# Patient Record
Sex: MALE
[De-identification: names, ages, dates, MRNs, and addresses within clinical notes are randomized per-mention and may not be internally consistent; named-entity substitution may affect disease eponyms.]

---

## 2017-03-28 ENCOUNTER — HOSPITAL ENCOUNTER (OUTPATIENT)
Dept: HOSPITAL 42 - CATH | Age: 77
Discharge: HOME | End: 2017-03-28
Attending: INTERNAL MEDICINE
Payer: COMMERCIAL

## 2017-03-28 VITALS — BODY MASS INDEX: 21.6 KG/M2

## 2017-03-28 DIAGNOSIS — I25.10: Primary | ICD-10-CM

## 2017-03-28 DIAGNOSIS — I10: ICD-10-CM

## 2017-03-28 DIAGNOSIS — Z79.84: ICD-10-CM

## 2017-03-28 DIAGNOSIS — E11.9: ICD-10-CM

## 2017-03-28 LAB
ADD MANUAL DIFF?: NO
APTT BLD: 26.7 SECONDS (ref 23.7–30.8)
BASOPHILS # BLD AUTO: 0.02 K/MM3 (ref 0–2)
BASOPHILS NFR BLD: 0.3 % (ref 0–3)
BUN SERPL-MCNC: 23 MG/DL (ref 7–21)
CALCIUM SERPL-MCNC: 9.6 MG/DL (ref 8.4–10.5)
CHLORIDE SERPL-SCNC: 105 MMOL/L (ref 98–107)
CO2 SERPL-SCNC: 28 MMOL/L (ref 21–33)
EOSINOPHIL # BLD: 0.5 10*3/UL (ref 0–0.7)
EOSINOPHIL NFR BLD: 6.8 % (ref 1.5–5)
ERYTHROCYTE [DISTWIDTH] IN BLOOD BY AUTOMATED COUNT: 14.5 % (ref 11.5–14.5)
GLUCOSE SERPL-MCNC: 102 MG/DL (ref 70–110)
GRANULOCYTES # BLD: 5.46 10*3/UL (ref 1.4–6.5)
GRANULOCYTES NFR BLD: 68.6 % (ref 50–68)
HCT VFR BLD CALC: 40.7 % (ref 42–52)
INR PPP: 0.91 (ref 0.93–1.08)
LYMPHOCYTES # BLD: 1.5 10*3/UL (ref 1.2–3.4)
LYMPHOCYTES NFR BLD AUTO: 18.9 % (ref 22–35)
MCH RBC QN AUTO: 30 PG (ref 25–35)
MCHC RBC AUTO-ENTMCNC: 33.2 G/DL (ref 31–37)
MCV RBC AUTO: 90.4 FL (ref 80–105)
MONOCYTES # BLD AUTO: 0.4 10*3/UL (ref 0.1–0.6)
MONOCYTES NFR BLD: 5.4 % (ref 1–6)
PLATELET # BLD: 248 10^3/UL (ref 120–450)
PMV BLD AUTO: 10.5 FL (ref 7–11)
POTASSIUM SERPL-SCNC: 4.6 MMOL/L (ref 3.6–5)
SODIUM SERPL-SCNC: 142 MMOL/L (ref 132–148)
WBC # BLD AUTO: 8 10^3/UL (ref 4.5–11)

## 2017-03-28 PROCEDURE — 86850 RBC ANTIBODY SCREEN: CPT

## 2017-03-28 PROCEDURE — 99152 MOD SED SAME PHYS/QHP 5/>YRS: CPT

## 2017-03-28 PROCEDURE — 85025 COMPLETE CBC W/AUTO DIFF WBC: CPT

## 2017-03-28 PROCEDURE — 99153 MOD SED SAME PHYS/QHP EA: CPT

## 2017-03-28 PROCEDURE — 36415 COLL VENOUS BLD VENIPUNCTURE: CPT

## 2017-03-28 PROCEDURE — 93458 L HRT ARTERY/VENTRICLE ANGIO: CPT

## 2017-03-28 PROCEDURE — 93571 IV DOP VEL&/PRESS C FLO 1ST: CPT

## 2017-03-28 PROCEDURE — 80048 BASIC METABOLIC PNL TOTAL CA: CPT

## 2017-03-28 PROCEDURE — 86900 BLOOD TYPING SEROLOGIC ABO: CPT

## 2017-03-28 PROCEDURE — 85610 PROTHROMBIN TIME: CPT

## 2017-03-28 PROCEDURE — 85730 THROMBOPLASTIN TIME PARTIAL: CPT

## 2017-03-29 VITALS — TEMPERATURE: 97.4 F

## 2017-03-29 VITALS
OXYGEN SATURATION: 99 % | RESPIRATION RATE: 18 BRPM | HEART RATE: 64 BPM | DIASTOLIC BLOOD PRESSURE: 70 MMHG | SYSTOLIC BLOOD PRESSURE: 139 MMHG

## 2017-05-31 ENCOUNTER — HOSPITAL ENCOUNTER (OUTPATIENT)
Dept: HOSPITAL 42 - ED | Age: 77
Setting detail: OBSERVATION
LOS: 2 days | Discharge: HOME | End: 2017-06-02
Attending: FAMILY MEDICINE | Admitting: FAMILY MEDICINE
Payer: MEDICARE

## 2017-05-31 VITALS — BODY MASS INDEX: 22.1 KG/M2

## 2017-05-31 DIAGNOSIS — K52.9: Primary | ICD-10-CM

## 2017-05-31 DIAGNOSIS — I25.10: ICD-10-CM

## 2017-05-31 DIAGNOSIS — M19.90: ICD-10-CM

## 2017-05-31 DIAGNOSIS — I10: ICD-10-CM

## 2017-05-31 DIAGNOSIS — E11.649: ICD-10-CM

## 2017-05-31 DIAGNOSIS — M54.30: ICD-10-CM

## 2017-05-31 DIAGNOSIS — K21.9: ICD-10-CM

## 2017-05-31 DIAGNOSIS — Z79.84: ICD-10-CM

## 2017-05-31 DIAGNOSIS — Z79.82: ICD-10-CM

## 2017-05-31 DIAGNOSIS — Z86.73: ICD-10-CM

## 2017-05-31 LAB
ADD MANUAL DIFF?: NO
ALBUMIN/GLOB SERPL: 1.1 {RATIO}
ALP SERPL-CCNC: 37 U/L
ALT SERPL-CCNC: 31 U/L
APPEARANCE UR: CLEAR
AST SERPL-CCNC: 34 U/L
BACTERIA #/AREA URNS HPF: (no result) /[HPF]
BASE EXCESS BLDV CALC-SCNC: 1.1 MMOL/L
BASOPHILS # BLD AUTO: 0.01 K/MM3
BASOPHILS NFR BLD: 0.1 %
BILIRUB SERPL-MCNC: 0.5 MG/DL
BILIRUB UR-MCNC: NEGATIVE MG/DL
BUN SERPL-MCNC: 29 MG/DL
CALCIUM SERPL-MCNC: 8.7 MG/DL
CHLORIDE SERPL-SCNC: 109 MMOL/L
CO2 SERPL-SCNC: 24 MMOL/L
COLOR UR: YELLOW
EOSINOPHIL # BLD: 0.1 10*3/UL
EOSINOPHIL NFR BLD: 0.4 %
EPI CELLS #/AREA URNS HPF: (no result) /HPF
ERYTHROCYTE [DISTWIDTH] IN BLOOD BY AUTOMATED COUNT: 14.3 %
GLOBULIN SER-MCNC: 3.1 GM/DL
GLUCOSE SERPL-MCNC: 125 MG/DL
GLUCOSE UR STRIP-MCNC: NEGATIVE MG/DL
GRANULOCYTES # BLD: 12.78 10*3/UL
GRANULOCYTES NFR BLD: 91.2 %
HCT VFR BLD CALC: 38.5 %
KETONES UR STRIP-MCNC: NEGATIVE MG/DL
LEUKOCYTE ESTERASE UR-ACNC: NEGATIVE LEU/UL
LIPASE SERPL-CCNC: 219 U/L
LYMPHOCYTES # BLD: 0.8 10*3/UL
LYMPHOCYTES NFR BLD AUTO: 5.9 %
MCH RBC QN AUTO: 30 PG
MCHC RBC AUTO-ENTMCNC: 33 G/DL
MCV RBC AUTO: 90.8 FL
MONOCYTES # BLD AUTO: 0.3 10*3/UL
MONOCYTES NFR BLD: 2.4 %
PH BLDV: 7.44 [PH]
PH UR STRIP: 7 [PH]
PLATELET # BLD: 219 10^3/UL
PMV BLD AUTO: 10.6 FL
POTASSIUM SERPL-SCNC: 4.4 MMOL/L
PROT SERPL-MCNC: 6.6 G/DL
PROT UR STRIP-MCNC: 100 MG/DL
RBC # UR STRIP: NEGATIVE /UL
RBC #/AREA URNS HPF: (no result) /HPF
SODIUM SERPL-SCNC: 138 MMOL/L
SP GR UR STRIP: 1.01
UROBILINOGEN UR STRIP-ACNC: 0.2 E.U./DL
WBC # BLD AUTO: 14 10^3/UL
WBC #/AREA URNS HPF: (no result) /HPF

## 2017-05-31 PROCEDURE — 83690 ASSAY OF LIPASE: CPT

## 2017-05-31 PROCEDURE — 96365 THER/PROPH/DIAG IV INF INIT: CPT

## 2017-05-31 PROCEDURE — 80053 COMPREHEN METABOLIC PANEL: CPT

## 2017-05-31 PROCEDURE — 82948 REAGENT STRIP/BLOOD GLUCOSE: CPT

## 2017-05-31 PROCEDURE — 82803 BLOOD GASES ANY COMBINATION: CPT

## 2017-05-31 PROCEDURE — 96367 TX/PROPH/DG ADDL SEQ IV INF: CPT

## 2017-05-31 PROCEDURE — 99285 EMERGENCY DEPT VISIT HI MDM: CPT

## 2017-05-31 PROCEDURE — 74177 CT ABD & PELVIS W/CONTRAST: CPT

## 2017-05-31 PROCEDURE — 87040 BLOOD CULTURE FOR BACTERIA: CPT

## 2017-05-31 PROCEDURE — 85025 COMPLETE CBC W/AUTO DIFF WBC: CPT

## 2017-05-31 PROCEDURE — 81001 URINALYSIS AUTO W/SCOPE: CPT

## 2017-05-31 PROCEDURE — 93005 ELECTROCARDIOGRAM TRACING: CPT

## 2017-05-31 PROCEDURE — 96366 THER/PROPH/DIAG IV INF ADDON: CPT

## 2017-05-31 PROCEDURE — 85027 COMPLETE CBC AUTOMATED: CPT

## 2017-05-31 PROCEDURE — 96375 TX/PRO/DX INJ NEW DRUG ADDON: CPT

## 2017-05-31 PROCEDURE — 36415 COLL VENOUS BLD VENIPUNCTURE: CPT

## 2017-05-31 PROCEDURE — 74181 MRI ABDOMEN W/O CONTRAST: CPT

## 2017-05-31 PROCEDURE — 80074 ACUTE HEPATITIS PANEL: CPT

## 2017-05-31 NOTE — ED PDOC
Arrival/HPI





- General


Chief Complaint: Abdominal Pain


Time Seen by Provider: 05/31/17 07:03


Historian: Patient





- History of Present Illness


Narrative History of Present Illness (Text): 


05/31/17 07:05


A 77 year old male, whose past medical history includes multiple small bowel 

obstructions, appendectomy, and cholecystectomy, presents to the emergency 

department complaining of torito-umbilical abdominal pain that began 4 hours ago. 

Patient notes pain is similar to previous episodes of small bowel obstruction 

and is associated with nausea and one episode of non bloody non bilious 

vomiting. He denies any urinary/bowel changes, fever, chest pain, shortness of 

breath, or any other complaints at this time.





PMD: Dr. Mendoza


Time/Duration: 4-6 hours


Symptom Onset: Sudden


Symptom Course: Unchanged


Quality: Other


Activities at Onset: Rest


Context: Home


Associated Symptoms (Text): 


nausea and vomiting





Past Medical History





- Provider Review


Nursing Documentation Reviewed: Yes





- Infectious Disease


Hx of Infectious Diseases: None





- Tetanus Immunization


Tetanus Immunization: Unknown





- Reproductive


Currently Pregnant: No





- Cardiac


Hx Pacemaker: No





- Pulmonary


Hx Respiratory Disorders: No





- Neurological


Hx Paralysis: No





- HEENT


Hx HEENT Disorder: Yes (WEARS RX GLASSES FOR READING)


Hx Cataracts: Yes (BILATERAL SURGERY)





- Renal


Hx Renal Disorder: No





- Endocrine/Metabolic


Hx Diabetes Mellitus Type 2: Yes





- Hematological/Oncological


Hx Blood Transfusions: No


Hx Blood Transfusion Reaction: No





- Integumentary


Hx Dermatological Disorder: No





- Musculoskeletal/Rheumatological


Hx Musculoskeletal Disorders: Yes (SCIATICA)





- Gastrointestinal


Hx Gastrointestinal Disorders: Yes (APPENDECTOMY(RUPTURED APPENDIX))


Hx Gall Bladder Disease: Yes (CHOLELITHIASIS)


Other/Comment: Bowel obstruction





- Genitourinary/Gynecological


Hx Genitourinary Disorders: Yes


Hx Prostate Problems: Yes





- Psychiatric


Hx Emotional Abuse: No


Hx Physical Abuse: No


Hx Substance Use: No





- Past Surgical History


Past Surgical History: No Previous





- Surgical History


Hx Appendectomy: Yes


Hx Cholecystectomy: Yes





- Anesthesia


Hx Anesthesia Reactions: No


Hx Malignant Hyperthermia: No





- Suicidal Assessment


Feels Threatened In Home Enviroment: No





Family/Social History





- Physician Review


Nursing Documentation Reviewed: Yes


Family/Social History: Unknown Family HX


Smoking Status: Never Smoked


Hx Alcohol Use: Yes (RED WINE AFTER DINNER)


Hx Substance Use: No


Hx Substance Use Treatment: No





Allergies/Home Meds


Allergies/Adverse Reactions: 


Allergies





No Known Allergies Allergy (Verified 03/05/17 03:24)


 








Home Medications: 


 Home Meds











 Medication  Instructions  Recorded  Confirmed


 


Valsartan [Diovan] 40 mg PO DAILY 11/30/16 05/31/17


 


metFORMIN [glucOPHAGE] 500 mg PO DAILY 11/30/16 05/31/17


 


Aspirin [Ecotrin] 81 mg PO DAILY 03/23/17 05/31/17














Review of Systems





- Review of Systems


Constitutional: absent: Fevers


ENT: absent: Rhinorrhea


Respiratory: absent: SOB, Cough


Cardiovascular: absent: Chest Pain


Gastrointestinal: Abdominal Pain, Nausea, Vomiting.  absent: Stool Changes, 

Constipation, Diarrhea, Hematochezia


Genitourinary Male: absent: Dysuria, Frequency, Hematuria, Urinary Output 

Changes


Musculoskeletal: absent: Back Pain, Neck Pain


Skin: absent: Rash


Neurological: absent: Headache


Endocrine: absent: Polyuria


Psychiatric: absent: Depression





Physical Exam


Vital Signs Reviewed: Yes


Vital Signs











  Temp Pulse Resp BP Pulse Ox


 


 05/31/17 11:20   59 L  16  164/92 H  100


 


 05/31/17 08:30   60  16  160/70 H  97


 


 05/31/17 06:22  97.8 F  61  16  170/78 H  100











Temperature: Afebrile


Blood Pressure: Hypertensive


Pulse: Regular


Respiratory Rate: Normal


Appearance: Positive for: Well-Appearing, Non-Toxic, Comfortable


Pain Distress: None


Mental Status: Positive for: Alert and Oriented X 3





- Systems Exam


Head: Present: Atraumatic, Normocephalic


Pupils: Present: PERRL


Extroacular Muscles: Present: EOMI


Conjunctiva: Present: Normal


Mouth: Present: Moist Mucous Membranes


Neck: Present: Normal Range of Motion


Respiratory/Chest: Present: Clear to Auscultation, Good Air Exchange.  No: 

Respiratory Distress, Accessory Muscle Use


Cardiovascular: Present: Regular Rate and Rhythm, Normal S1, S2.  No: Murmurs


Abdomen: Present: Normal Bowel Sounds.  No: Tenderness, Distention, Peritoneal 

Signs, Rebound, Guarding


Back: No: CVA Tenderness


Upper Extremity: Present: Normal Inspection.  No: Cyanosis, Edema


Lower Extremity: Present: Normal Inspection.  No: Edema


Neurological: Present: GCS=15, CN II-XII Intact, Speech Normal


Skin: Present: Warm, Dry, Normal Color.  No: Rashes


Psychiatric: Present: Alert, Oriented x 3, Normal Insight, Normal Concentration





Medical Decision Making


ED Course and Treatment: 


05/31/17 07:05


Impression:


A 77 year old male with abdominal pain.





Differential Diagnosis include but are not limited to: Small bowel obstruction 

vs. Pancreatitis vs. Gastritis





Plan:


-- Abdomen/Pelvis CT 


-- Labs


-- Urinalysis 


-- Morphine, Zofran and IV Fluids


-- Reassess and disposition





Prior Visits:


Notes and results from previous visits were reviewed. The patient last 

presented to the emergency department on 03/05/17 for evaluation of abdominal 

pain. 





Progress Notes:





EKG:


Ordered, reviewed, and independently interpreted the EKG.


Rate : 61 BPM


Rhythm : NSR


Interpretation : Normal intervals, No ST/T changes


05/31/17 07:19





05/31/17 12:39


CT as above.  Spoke to Dr. Mendoza.  Due to CT results, elevated wbc, and 

abdominal pain, recommend med/sx observation with for IV antibiotics with GI 

and ID consult.





- Lab Interpretations


I have reviewed the lab results: Yes





- Medication Orders


Current Medication Orders: 








Ciprofloxacin (Cipro 400mg/200ml Dsw)  400 mg in 200 mls @ 133.3 mls/hr IVPB 

STAT STA


   PRN Reason: Protocol


   Stop: 05/31/17 13:57


Metronidazole (Flagyl)  500 mg in 100 mls @ 100 mls/hr IVPB STAT STA


   PRN Reason: Protocol


   Stop: 05/31/17 13:26





Discontinued Medications





Sodium Chloride (Sodium Chloride 0.9%)  500 mls @ 999 mls/hr IV .Q31M STA


   Stop: 05/31/17 07:44


   Last Admin: 05/31/17 07:46  Dose: 999 mls/hr





Iohexol (Omnipaque 240 (50 Ml)) Confirm Administered Dose 50 ml .ROUTE .STK-MED 

ONE


   Stop: 05/31/17 07:28


Iohexol (Omnipaque 350 100 Ml) Confirm Administered Dose 350 mg .ROUTE .STK-MED 

ONE


   Stop: 05/31/17 09:12


Morphine Sulfate (Morphine)  4 mg IVP STAT STA


   Stop: 05/31/17 07:15


   Last Admin: 05/31/17 07:47  Dose: 4 mg





Re-Assess: MAR Pain Assessment


 Document     05/31/17 08:47  MD  (Rec: 05/31/17 09:24  MD  [id number])


     Pain Reassessment


      Is this a pain reassessment?               Yes


     Sleep


      Is patient sleeping during reassessment?   No


     Presence of Pain


      Presence of Pain                           No





Ondansetron HCl (Zofran Inj)  4 mg IVP STAT STA


   Stop: 05/31/17 07:15


   Last Admin: 05/31/17 07:47  Dose: 4 mg











ED OBSERVATION


Date of observation admission: 05/31/17


Time of observation admission: 07:10





- Observation admission statement


Patient is being placed in observation because:: 


abdominal pain, need CT scan





- Goals of Observation


Goals of observation are:: 


pain management and obtain series of abdominal examinations





- Progress Note


Progress Note: 





05/31/17 07:10


EKG:


Ordered, reviewed, and independently interpreted the EKG.


Rate : 61 BPM


Rhythm : NSR


Interpretation : Normal intervals, No ST/T changes





05/31/17 08:15


On reevaluation, the patient is sitting up in bed drinking contrast.





05/31/17 10:10


On reevaluation, the patient is resting and is in no acute distress at this 

time. Abdomen remain soft and non tender.





05/31/17 12:00


On reevaluation, the patient is resting comfortably, awaiting CT results.





05/31/17 12:20


Abdomen/Pelvis CT: Creator : Anam Brown MD


COMPARISON: Comparison made with CT scan abdomen and pelvis 03/05/2017


FINDINGS:


LOWER THORAX: Atelectatic and scarring changes both lung bases right greater 

than left including the lingular and middle lobe regions.  No effusion. No 

evidence of basilar pneumothorax. Heart is enlarged. No significant pericardial 

effusion. Hiatal hernia. Minor wall thickening of the distal esophagus that 

likely due to protrusion of gastric mucosa.  Possibility of esophagitis not 

excluded. Small 


LIVER: Mild central intrahepatic biliary ductal dilatation likely due to some 

combination of cholecystectomy and advanced age. Mild fatty hepatic 

infiltration.  Portal and splenic veins are opacified. 


GALLBLADDER AND BILE DUCTS: Status post cholecystectomy with moderate 

dilatation (just over 13 mm) of the common bile duct. CBD dilatation is likely 

in part due to a combination of cholecystectomy and advanced age however MRCP 

could be performed for further evaluation as to evaluate the distal common bile 

duct, Ampulla of Vater and pancreatic head region 


PANCREAS: There is dilatation of the pancreatic duct . No discrete pancreatic 

mass lesion is identified however follow-up MRCP is recommended due to evaluate 

the distal common bile duct, pancreatic head and Ampulla of Vater. 


SPLEEN: The spleen exhibits normal size and attenuation pattern.  There is a 

tiny approximately 3.5-4 mm nonspecific low-attenuation focus at inferior 

aspect of the splenic parenchyma too small characterize. . 


ADRENALS: Mild adrenal hyperplasia. 


KIDNEYS AND URETERS: The kidneys exhibit relatively symmetric nephrograms. No 

evidence of nephrolithiasis. Prominent bilateral extrarenal pelves and proximal 

ureters though both ureters taper rapidly to a normal caliber.


BLADDER: Urinary bladder is moderate to significantly distended.  Mild wall 

thickening on may in part be due to muscular hypertrophy. Other intrinsic/

invasive wall lesion less likely. Rule out chronic outlet obstruction due to 

enlarged prostate gland. 


REPRODUCTIVE: Prostate gland appears enlarged likely due to benign prostatic 

hypertrophy however correlation with PSA to exclude prostate carcinoma. . 


APPENDIX: Appendix is not seen with complete certainty however no obvious 

inflammatory changes right lower quadrant of the abdomen. There is moderate 

amount of stool seen throughout the entire colon consistent with fecal retention

/ constipation.


BOWEL: Evaluation of the bowel is limited due to incomplete opacification. The 

stomach is distended with oral contrast material and air. . .  There are 

nonspecific wall thickening changes of a loop of jejunum right mid abdomen 

associated mild distension of bone 1 2 loops of proximal small of bowel of 

uncertain etiology; rule out out localized enteritis.  Other intrinsic/invasive 

wall lesion cannot be excluded.  Clinical correlation recommended.. No evidence 

of complete acute mechanical small bowel obstruction however there as there is 

a small amount of oral contrast material seen opacifying the proximal cecum.  

The appendix is not seen with complete certainty. Mild wall thickening of the 

sigmoid at colon to the level of the rectosigmoid junction which may in part be 

secondary to underdistention and peristalsis is well as adherent under 

opacified bowel however possibility of mild nonspecific inflammatory process 

cannot be excluded.  Clinical correlation recommended. 


PERITONEUM: No evidence of free intraperitoneal air. No free or loculated fluid 

collections. Air.


LYMPH NODES: There appears to be a few small nonspecific retroperitoneal lymph 

nodes. 


VASCULATURE: Unremarkable. No aortic aneurysm. 


BONES: Multilevel degenerative spondylosis of the lower thoracic and lumbar 

spine. Bilateral spondylolysis L5 level with less than grade 1 

spondylolisthesis at the L5-S1 level. . No acute compression fractures no 

retropulsed fragments. 


OTHER FINDINGS: None. 


IMPRESSION: Findings consistent with nonspecific enteritis at involving the the 

jejunum of with luminal narrowing though there is no evidence of complete 

obstruction.  In addition, there is also mild wall thickening of the sigmoid 

colon which may in part be secondary to some combination of incomplete 

distention, as peristalsis and adherent under opacified bowel however 

nonspecific inflammatory process involving the colon should also be excluded. 

Mild constipation.  Enlarged prostate gland with moderately distended urinary 

bladder.  Rule out bladder outlet obstruction. Correlation with PSA recommended 

to exclude prostatic carcinoma.  Status post cholecystectomy with moderate 

dilatation of the common bile duct.  There is also dilatation of the pancreatic 

duct.  No definitive pancreatic mass lesion seen however followup MRCP is 

recommended to evaluate the distal common bile duct, pancreatic head and 

Ampulla of Vater. There is also mild intrahepatic biliary ductal dilatation. 

Mild fatty hepatic infiltration.  Tiny sub cm low-attenuation focus inferior 

aspect of the splenic parenchyma too small to characterize. Adrenal hyperplasia 

felt to be present.  Bilateral spondylolysis L5 segment with less than grade 1 

spondylolisthesis  See above discussion for additional findings and details.




















- Scribe Statement


The provider has reviewed the documentation as recorded by the Kizzyibe





Calvin Chacko





Provider Scribe Attestation:


All medical record entries made by the Kizzyibpage were at my direction and 

personally dictated by me. I have reviewed the chart and agree that the record 

accurately reflects my personal performance of the history, physical exam, 

medical decision making, and the department course for this patient. I have 

also personally directed, reviewed, and agree with the discharge instructions 

and disposition.











Disposition/Present on Arrival





- Present on Arrival


Any Indicators Present on Arrival: No


History of DVT/PE: No


History of Uncontrolled Diabetes: No


Urinary Catheter: No


History of Decub. Ulcer: No


History Surgical Site Infection Following: None





- Disposition


Have Diagnosis and Disposition been Completed?: Yes


Diagnosis: 


 Abdominal pain, Enteritis, Colitis





Disposition: HOSPITALIZED


Disposition Time: 07:10


Patient Plan: Observation


Condition: FAIR

## 2017-05-31 NOTE — CT
PROCEDURE:  CT abdomen and pelvis dated 05/31/2017 



HISTORY:

abdominal pain, hx of SBO



COMPARISON:

Comparison made with CT scan abdomen and pelvis 03/05/2017



TECHNIQUE:

Contiguous axial images of the abdomen and pe pelvis performed 

following oral and intravenous injection of approximately 100 cc of 

Omnipaque 350 contrast material. Coronal and Sagittal reformats 

generated. 



Radiation dose:



Total exam DLP = 247.81 mGy-cm.



This CT exam was performed using one or more of the following dose 

reduction techniques: Automated exposure control, adjustment of the 

mA and/or kV according to patient size, and/or use of iterative 

reconstruction technique.



FINDINGS:



LOWER THORAX:

Atelectatic and scarring changes both lung bases right greater than 

left including the lingular and middle lobe regions.  No effusion. No 

evidence of basilar pneumothorax. Heart is enlarged. No significant 

pericardial effusion. Hiatal hernia. Minor wall thickening of the 

distal esophagus that likely due to protrusion of gastric mucosa.  

Possibility of esophagitis not excluded. Small 



LIVER:

Mild central intrahepatic biliary ductal dilatation likely due to 

some combination of cholecystectomy and advanced age. Mild fatty 

hepatic infiltration.  Portal and splenic veins are opacified. 



GALLBLADDER AND BILE DUCTS:

Status post cholecystectomy with moderate dilatation (just over 13 

mm) of the common bile duct. CBD dilatation is likely in part due to 

a combination of cholecystectomy and advanced age however MRCP could 

be performed for further evaluation as to evaluate the distal common 

bile duct, Ampulla of Vater and pancreatic head region 



PANCREAS:

There is dilatation of the pancreatic duct . No discrete pancreatic 

mass lesion is identified however follow-up MRCP is recommended due 

to evaluate the distal common bile duct, pancreatic head and Ampulla 

of Vater. 



SPLEEN:

The spleen exhibits normal size and attenuation pattern.  There is a 

tiny approximately 3.5-4 mm nonspecific low-attenuation focus at 

inferior aspect of the splenic parenchyma too small characterize. . 



ADRENALS:

Mild adrenal hyperplasia. 



KIDNEYS AND URETERS:

The kidneys exhibit relatively symmetric nephrograms. No evidence of 

nephrolithiasis. Prominent bilateral extrarenal pelves and proximal 

ureters though both ureters taper rapidly to a normal caliber.



BLADDER:

Urinary bladder is moderate to significantly distended.  Mild wall 

thickening on may in part be due to muscular hypertrophy. Other 

intrinsic/invasive wall lesion less likely. Rule out chronic outlet 

obstruction due to enlarged prostate gland. 



REPRODUCTIVE:

Prostate gland appears enlarged likely due to benign prostatic 

hypertrophy however correlation with PSA to exclude prostate 

carcinoma. . 



APPENDIX:

Appendix is not seen with complete certainty however no obvious 

inflammatory changes right lower quadrant of the abdomen. There is 

moderate amount of stool seen throughout the entire colon consistent 

with fecal retention/ constipation.



BOWEL:

Evaluation of the bowel is limited due to incomplete opacification. 

The stomach is distended with oral contrast material and air. . .  

There are nonspecific wall thickening changes of a loop of jejunum 

right mid abdomen associated mild distension of bone 1 2 loops of 

proximal small of bowel of uncertain etiology; rule out out localized 

enteritis.  Other intrinsic/invasive wall lesion cannot be excluded.  

Clinical correlation recommended.. No evidence of complete acute 

mechanical small bowel obstruction however there as there is a small 

amount of oral contrast material seen opacifying the proximal cecum. 



The appendix is not seen with complete certainty. Mild wall 

thickening of the sigmoid at colon to the level of the rectosigmoid 

junction which may in part be secondary to underdistention and 

peristalsis is well as adherent under opacified bowel however 

possibility of mild nonspecific inflammatory process cannot be 

excluded.  Clinical correlation recommended. 



PERITONEUM:

No evidence of free intraperitoneal air. No free or loculated fluid 

collections. Air.



LYMPH NODES:

There appears to be a few small nonspecific retroperitoneal lymph 

nodes. 



VASCULATURE:

Unremarkable. No aortic aneurysm. 



BONES:

Multilevel degenerative spondylosis of the lower thoracic and lumbar 

spine. Bilateral spondylolysis L5 level with less than grade 1 

spondylolisthesis at the L5-S1 level. . No acute compression 

fractures no retropulsed fragments. 



OTHER FINDINGS:

None. 



IMPRESSION:

Findings consistent with nonspecific enteritis at involving the the 

jejunum of with luminal narrowing though there is no evidence of 

complete obstruction.  In addition, there is also mild wall 

thickening of the sigmoid colon which may in part be secondary to 

some combination of incomplete distention, as peristalsis and 

adherent under opacified bowel however nonspecific inflammatory 

process involving the colon should also be excluded. Mild 

constipation. 



Enlarged prostate gland with moderately distended urinary bladder.  

Rule out bladder outlet obstruction. Correlation with PSA recommended 

to exclude prostatic carcinoma. 



Status post cholecystectomy with moderate dilatation of the common 

bile duct.  There is also dilatation of the pancreatic duct.  No 

definitive pancreatic mass lesion seen however followup MRCP is 

recommended to evaluate the distal common bile duct, pancreatic head 

and Ampulla of Vater. There is also mild intrahepatic biliary ductal 

dilatation. Mild fatty hepatic infiltration. 



Tiny sub cm low-attenuation focus inferior aspect of the splenic 

parenchyma too small to characterize. 



Adrenal hyperplasia felt to be present. 



Bilateral spondylolysis L5 segment with less than grade 1 

spondylolisthesis 



See above discussion for additional findings and details.

## 2017-05-31 NOTE — CARD
--------------- APPROVED REPORT --------------





EKG Measurement

Heart [id number]

DC [id number]

[id number]

[id number]

[id number]



<Conclusion>

Normal sinus rhythm

Normal ECG

## 2017-05-31 NOTE — CP.PCM.PN
Subjective





- Date & Time of Evaluation


Date of Evaluation: 05/31/17


Time of Evaluation: 21:21





- Subjective


Subjective: 


S:Nurse Lisa called with Finger Stick Blood Glucose reading of 61 mg%.


    Patient was asymptomatic.


    Medical record was reviewed.


    Half ampoule of Dextrose 50 % was ordered to be administered.


    When I came to see patient he was sleeping.








O:


Last Vital Signs





 3





Temp  97.9 F   05/31/17 16:00


 


Pulse  57 L  05/31/17 16:00


 


Resp  17   05/31/17 16:46


 


BP  133/75   05/31/17 16:00


 


Pulse Ox  96   05/31/17 16:00








   Stable, not in distress.


   LUNGS:Normal breathing pattern.





A:Hypoglycemia.





P:Dextrose 50 % 25 mg IV x 1.


   Repeat finger stick blood glucose was 82 mg %.





Objective





- Vital Signs/Intake and Output


Vital Signs (last 24 hours): 


 











Temp Pulse Resp BP Pulse Ox


 


 97.9 F   57 L  17   133/75   96 


 


 05/31/17 16:00  05/31/17 16:00  05/31/17 16:46  05/31/17 16:00  05/31/17 16:00











- Medications


Medications: 


 Current Medications





Aspirin (Ecotrin)  81 mg PO DAILY [facility]HAD


Sodium Chloride (Sodium Chloride 0.45%)  1,000 mls @ 60 mls/hr IV .[id number] [facility]


   Last Admin: 05/31/17 14:07 Dose:  60 mls/hr


Piperacillin Sod/Tazobactam Sod (Zosyn 3.375 In Ns 100ml)  100 mls @ 200 mls/hr 

IVPB Q6 [facility]ARH[facility]


   PRN Reason: Protocol


   Stop: 06/07/17 18:01


   Last Admin: 05/31/17 17:41 Dose:  200 mls/hr


Insulin Human Regular (Humulin R Med)  0 units SC ACHS FARHAD


   PRN Reason: Protocol


   Last Admin: 05/31/17 16:30 Dose:  Not Given


Losartan Potassium (Cozaar)  25 mg PO DAILY FARHAD











- Labs


Labs: 


 





 05/31/17 07:15 





 05/31/17 08:00

## 2017-05-31 NOTE — CP.PCM.CON
<Jean Borrego - Last Filed: 06/01/17 12:12>





History of Present Illness





- History of Present Illness


History of Present Illness: 





PGY4 GI Fellow Consult Note


Patient is a 78yo male with PMHx significant for multiple SBO, TIA, CAD (RCA 60

% stenotic on medical management), GERD, DM, HTN who presented to the ED with 

one day of abdominal pain. The patient has a history of multiple small bowel 

obstructions over the past two years, treated coservatively as he has been 

deemed a poor surgical candidate due to his CAD. Patient states he first 

noticed diffuse/torito-umbilical cramping abdominal pain last night. He was able 

to sleep through the evening and went to work this morning without issue. At 

work, pain intensified and he called his wife to pick him up and bring him to 

the ED as he was concerned for recurrent SBO. He denies any change in bowel 

habits and passed a normal BM yesterday. Passing flatus at present. Denies any 

nausea, vomiting, weight loss, hematochezia, melena.





PMHx: See HPI


PSHx: Appendectomy, cholecystectomy


FHx: Discussed with patient and denies any significant family history


Social: Denies tobacco or illicit drug use; 1 glass red wine daily


Endo: No prior evaluations





Review of Systems





- Constitutional


Constitutional: absent: Anorexia, Chills, Fever





- EENT


Eyes: absent: Change in Vision


Nose/Mouth/Throat: absent: Sore Throat





- Cardiovascular


Cardiovascular: absent: Chest Pain, Dyspnea, Dyspnea on Exertion





- Respiratory


Respiratory: absent: Cough, Dyspnea, Excessive Mucous Production





- Gastrointestinal


Gastrointestinal: Abdominal Pain, Bloating, Cramping.  absent: Constipation, 

Diarrhea, Dyspepsia, Early Satiety, Heartburn, Hematemesis, Hematochezia, Loose 

Stools, Melena, Nausea, Vomiting





- Genitourinary


Genitourinary: absent: Dysuria, Urinary Frequency, Urinary Urgency





- Musculoskeletal


Musculoskeletal: absent: Back Pain, Neck Pain





- Integumentary


Integumentary: absent: New Lesions, Rash





- Neurological


Neurological: absent: Dizziness, Numbness, Focal Weakness





- Psychiatric


Psychiatric: absent: Anxiety, Depression





- Endocrine


Endocrine: absent: Polydipsia, Polyphagia, Polyuria





- Hematologic/Lymphatic


Hematologic: absent: Easy Bleeding, Easy Bruising, Lymphadenopathy





Past Patient History





- Infectious Disease


Hx of Infectious Diseases: None





- Tetanus Immunizations


Tetanus Immunization: Unknown





- Past Social History


Smoking Status: Never Smoked





- CARDIAC


Hx Pacemaker: No





- PULMONARY


Hx Respiratory Disorders: No





- NEUROLOGICAL


Hx Paralysis: No





- HEENT


Hx HEENT Problems: Yes (WEARS RX GLASSES FOR READING)


Hx Cataracts: Yes (BILATERAL SURGERY)





- RENAL


Hx Chronic Kidney Disease: No





- ENDOCRINE/METABOLIC


Hx Diabetes Mellitus Type 2: Yes





- HEMATOLOGICAL/ONCOLOGICAL


Hx Blood Transfusions: No


Hx Blood Transfusion Reaction: No





- INTEGUMENTARY


Hx Dermatological Problems: No





- MUSCULOSKELETAL/RHEUMATOLOGICAL


Hx Musculoskeletal Disorders: Yes (SCIATICA)





- GASTROINTESTINAL


Hx Gastrointestinal Disorders: Yes (APPENDECTOMY(RUPTURED APPENDIX))


Hx Gall Bladder Disease: Yes (CHOLELITHIASIS)


Other/Comment: Bowel obstruction





- GENITOURINARY/GYNECOLOGICAL


Hx Genitourinary Disorders: Yes


Hx Prostate Problems: Yes





- PSYCHIATRIC


Hx Emotional Abuse: No


Hx Physical Abuse: No


Hx Substance Use: No





- SURGICAL HISTORY


Hx Appendectomy: Yes


Hx Cholecystectomy: Yes





- ANESTHESIA


Hx Anesthesia Reactions: No


Hx Malignant Hyperthermia: No





Meds


Allergies/Adverse Reactions: 


 Allergies











Allergy/AdvReac Type Severity Reaction Status Date / Time


 


No Known Allergies Allergy   Verified 03/05/17 03:24














- Medications


Medications: 


 Current Medications





Aspirin (Ecotrin)  81 mg PO DAILY [facility]


Sodium Chloride (Sodium Chloride 0.45%)  1,000 mls @ 60 mls/hr IV .[id number] [facility]


   Last Admin: 05/31/17 14:07 Dose:  60 mls/hr


Insulin Human Regular (Humulin R Med)  0 units SC ACHS [facility]


   PRN Reason: Protocol


Losartan Potassium (Cozaar)  25 mg PO DAILY [facility]











Physical Exam





- Constitutional


Appears: Non-toxic, No Acute Distress





- Eye Exam


Eye Exam: EOMI, PERRL





- ENT Exam


ENT Exam: Mucous Membranes Moist





- Respiratory Exam


Respiratory Exam: Clear to Auscultation Bilateral.  absent: Rales, Rhonchi, 

Wheezes





- Cardiovascular Exam


Cardiovascular Exam: REGULAR RHYTHM, RRR, +S1, +S2





- GI/Abdominal Exam


GI & Abdominal Exam: Normal Bowel Sounds, Soft.  absent: Distended, Firm, 

Guarding, Organomegaly, Rigid, Tenderness





- Extremities Exam


Extremities exam: Positive for: normal inspection.  Negative for: pedal edema





- Neurological Exam


Neurological exam: Alert, Oriented x3





- Psychiatric Exam


Psychiatric exam: Normal Affect, Normal Mood





- Skin


Skin Exam: Dry, Warm





Results





- Vital Signs


Recent Vital Signs: 


 Last Vital Signs











Temp  97.8 F   05/31/17 06:22


 


Pulse  54 L  05/31/17 13:53


 


Resp  16   05/31/17 13:53


 


BP  154/88 H  05/31/17 13:53


 


Pulse Ox  100   05/31/17 13:53














- Labs


Result Diagrams: 


 06/01/17 07:30





 06/01/17 07:30


Labs: 


 Laboratory Results - last 24 hr











  05/31/17 05/31/17 05/31/17





  07:15 07:15 08:00


 


WBC  14.0 H D  


 


RBC  4.24  


 


Hgb  12.7 L  


 


Hct  38.5 L  


 


MCV  90.8  


 


MCH  30.0  


 


MCHC  33.0  


 


RDW  14.3  


 


Plt Count  219  


 


MPV  10.6  


 


Gran %  91.2 H  


 


Lymph % (Auto)  5.9 L  


 


Mono % (Auto)  2.4  


 


Eos % (Auto)  0.4 L  


 


Baso % (Auto)  0.1  


 


Gran #  12.78 H  


 


Lymph #  0.8 L  


 


Mono #  0.3  


 


Eos #  0.1  


 


Baso #  0.01  


 


pO2   198 H 


 


VBG pH   7.44 H 


 


VBG pCO2   37.0 L 


 


VBG HCO3   25.1 


 


VBG Total CO2   26.2 


 


VBG O2 Sat (Calc)   99.5 H 


 


VBG Base Excess   1.1 


 


VBG Potassium   5.5 H 


 


Sodium   139.0  138


 


Chloride   109.0 H  109 H


 


Glucose   147 H 


 


Lactate   1.1 


 


FiO2   21.0 


 


Potassium    4.4


 


Carbon Dioxide    24


 


Anion Gap    9 L


 


BUN    29 H


 


Creatinine    1.3


 


Est GFR ( Amer)    > 60


 


Est GFR (Non-Af Amer)    53


 


Random Glucose    125 H


 


Calcium    8.7


 


Total Bilirubin    0.5


 


AST    34


 


ALT    31


 


Alkaline Phosphatase    37 L


 


Total Protein    6.6


 


Albumin    3.5


 


Globulin    3.1


 


Albumin/Globulin Ratio    1.1


 


Lipase    219


 


Venous Blood Potassium   5.5 H 


 


Urine Color   


 


Urine Appearance   


 


Urine pH   


 


Ur Specific Gravity   


 


Urine Protein   


 


Urine Glucose (UA)   


 


Urine Ketones   


 


Urine Blood   


 


Urine Nitrate   


 


Urine Bilirubin   


 


Urine Urobilinogen   


 


Ur Leukocyte Esterase   


 


Urine RBC   


 


Urine WBC   


 


Ur Epithelial Cells   


 


Urine Bacteria   














  05/31/17





  11:30


 


WBC 


 


RBC 


 


Hgb 


 


Hct 


 


MCV 


 


MCH 


 


MCHC 


 


RDW 


 


Plt Count 


 


MPV 


 


Gran % 


 


Lymph % (Auto) 


 


Mono % (Auto) 


 


Eos % (Auto) 


 


Baso % (Auto) 


 


Gran # 


 


Lymph # 


 


Mono # 


 


Eos # 


 


Baso # 


 


pO2 


 


VBG pH 


 


VBG pCO2 


 


VBG HCO3 


 


VBG Total CO2 


 


VBG O2 Sat (Calc) 


 


VBG Base Excess 


 


VBG Potassium 


 


Sodium 


 


Chloride 


 


Glucose 


 


Lactate 


 


FiO2 


 


Potassium 


 


Carbon Dioxide 


 


Anion Gap 


 


BUN 


 


Creatinine 


 


Est GFR ( Amer) 


 


Est GFR (Non-Af Amer) 


 


Random Glucose 


 


Calcium 


 


Total Bilirubin 


 


AST 


 


ALT 


 


Alkaline Phosphatase 


 


Total Protein 


 


Albumin 


 


Globulin 


 


Albumin/Globulin Ratio 


 


Lipase 


 


Venous Blood Potassium 


 


Urine Color  Yellow


 


Urine Appearance  Clear


 


Urine pH  7.0


 


Ur Specific Gravity  1.015


 


Urine Protein  100 H


 


Urine Glucose (UA)  Negative


 


Urine Ketones  Negative


 


Urine Blood  Negative


 


Urine Nitrate  Negative


 


Urine Bilirubin  Negative


 


Urine Urobilinogen  0.2


 


Ur Leukocyte Esterase  Negative


 


Urine RBC  0 - 2


 


Urine WBC  0 - 2


 


Ur Epithelial Cells  0 - 2


 


Urine Bacteria  Trace














Assessment & Plan





- Assessment and Plan (Free Text)


Assessment: 





Patient is a 78yo male with PMHx significant for multiple SBO, TIA, CAD (RCA 60

% stenotic on medical management), GERD, DM, HTN who presented to the ED with 

one day of abdominal pain.


-Abdominal pain


-Abnormal noncontrast CT scan of the abdomen - questionable enteritis/colitis


-Dilated CBD/PD on CT scan


-CAD


Plan: 





-Advance to liquid diet as pain has resolved, continue to advance as tolerated


-Cipro/Flagyl given in the ED, agree with continuing for now given questionable 

colitis


-If pain persists/worsens, would recommend CT A/P with PO/IV contrast


-Check MRCP given double duct sign on CT (CBD/PD dilation)


-Serial abdominal examinations


-Patient will require outpatient colonoscopy





- Date & Time


Date: 05/31/17


Time: 15:30





<Deneen Macias - Last Filed: 06/01/17 13:24>





Meds





- Medications


Medications: 


 Current Medications





Aspirin (Ecotrin)  81 mg PO DAILY [facility]


   Last Admin: 06/01/17 11:20 Dose:  81 mg


Sodium Chloride (Sodium Chloride 0.45%)  1,000 mls @ 60 mls/hr IV .[id number] [facility]


   Last Admin: 05/31/17 14:07 Dose:  60 mls/hr


Piperacillin Sod/Tazobactam Sod (Zosyn 3.375 In Ns 100ml)  100 mls @ 200 mls/hr 

IVPB Q6 [facility]


   PRN Reason: Protocol


   Stop: 06/07/17 18:01


   Last Admin: 06/01/17 13:09 Dose:  200 mls/hr


Insulin Human Regular (Humulin R Med)  0 units SC ACHS [facility]


   PRN Reason: Protocol


   Last Admin: 06/01/17 13:09 Dose:  Not Given


Losartan Potassium (Cozaar)  25 mg PO DAILY [facility]


   Last Admin: 06/01/17 11:19 Dose:  25 mg


Morphine Sulfate (Morphine)  4 mg IVP Q4H [facility]


   PRN Reason: Pain, moderate (4-7)


Polyethylene Glycol (Miralax)  17 gm PO DAILY [facility]


   Last Admin: 06/01/17 11:20 Dose:  17 gm











Results





- Vital Signs


Recent Vital Signs: 


 Last Vital Signs











Temp  98.2 F   06/01/17 08:20


 


Pulse  60   06/01/17 11:19


 


Resp  20   06/01/17 08:20


 


BP  134/71   06/01/17 11:19


 


Pulse Ox  99   06/01/17 08:20














- Labs


Result Diagrams: 


 06/01/17 07:30





 06/01/17 07:30


Labs: 


 Laboratory Results - last 24 hr











  06/01/17 06/01/17 06/01/17





  07:30 07:30 11:04


 


WBC  6.1  D  


 


RBC  4.20  


 


Hgb  12.6 L  


 


Hct  38.7 L  


 


MCV  92.1  


 


MCH  30.0  


 


MCHC  32.6  


 


RDW  13.9  


 


Plt Count  198  


 


MPV  10.9  


 


Sodium   140 


 


Potassium   4.0 


 


Chloride   109 H 


 


Carbon Dioxide   25 


 


Anion Gap   10 


 


BUN   15 


 


Creatinine   1.2 


 


Est GFR ( Amer)   > 60 


 


Est GFR (Non-Af Amer)   58 


 


POC Glucose (mg/dL)    87


 


Random Glucose   90 


 


Calcium   8.5 


 


Total Bilirubin   1.1 


 


AST   63 H 


 


ALT   69 H 


 


Alkaline Phosphatase   48 


 


Total Protein   6.2 


 


Albumin   3.2 


 


Globulin   3.0 


 


Albumin/Globulin Ratio   1.1 














Attending/Attestation





- Attestation


I have personally seen and examined this patient.: Yes


I have fully participated in the care of the patient.: Yes


I have reviewed all pertinent clinical information: Yes


Notes (Text): 


Patient seen and examined with GI fellow.  Agree with his note as documented 

above with the following additions/exceptions.  This is a 78yo male with h/o 

recurrent small bowel obstruction, CAD, GERD, DM, HTN who is admitted with 

abdominal pain associated with nausea/vomiting.  CT scan on admission showed 

prominent small bowel loops with enteritis.  His CBD/PD were prominent.  He 

reports complete resolution of pain this morning with no further vomiting 

episodes.  He is passing flatus.  Will obtain MRCP for evaluation of prominent 

bile duct.  Continue conservative management with pain control/antiemetic 

therapy as needed.  Trial of clear liquids.  The patient would benefit from 

elective colonoscopy as outpatient.





06/01/17 13:23

## 2017-06-01 VITALS — RESPIRATION RATE: 20 BRPM

## 2017-06-01 LAB
ALBUMIN/GLOB SERPL: 1.1 {RATIO}
ALP SERPL-CCNC: 48 U/L
ALT SERPL-CCNC: 69 U/L
AST SERPL-CCNC: 63 U/L
BILIRUB SERPL-MCNC: 1.1 MG/DL
BUN SERPL-MCNC: 15 MG/DL
CALCIUM SERPL-MCNC: 8.5 MG/DL
CHLORIDE SERPL-SCNC: 109 MMOL/L
CO2 SERPL-SCNC: 25 MMOL/L
ERYTHROCYTE [DISTWIDTH] IN BLOOD BY AUTOMATED COUNT: 13.9 %
GLOBULIN SER-MCNC: 3 GM/DL
GLUCOSE SERPL-MCNC: 90 MG/DL
HCT VFR BLD CALC: 38.7 %
MCH RBC QN AUTO: 30 PG
MCHC RBC AUTO-ENTMCNC: 32.6 G/DL
MCV RBC AUTO: 92.1 FL
PLATELET # BLD: 198 10^3/UL
PMV BLD AUTO: 10.9 FL
POTASSIUM SERPL-SCNC: 4 MMOL/L
PROT SERPL-MCNC: 6.2 G/DL
SODIUM SERPL-SCNC: 140 MMOL/L
WBC # BLD AUTO: 6.1 10^3/UL

## 2017-06-01 RX ADMIN — PIPERACILLIN AND TAZOBACTAM SCH MLS/HR: 3; .375 INJECTION, POWDER, LYOPHILIZED, FOR SOLUTION INTRAVENOUS; PARENTERAL at 13:09

## 2017-06-01 RX ADMIN — PIPERACILLIN AND TAZOBACTAM SCH MLS/HR: 3; .375 INJECTION, POWDER, LYOPHILIZED, FOR SOLUTION INTRAVENOUS; PARENTERAL at 23:52

## 2017-06-01 RX ADMIN — PIPERACILLIN AND TAZOBACTAM SCH MLS/HR: 3; .375 INJECTION, POWDER, LYOPHILIZED, FOR SOLUTION INTRAVENOUS; PARENTERAL at 17:25

## 2017-06-01 RX ADMIN — INSULIN HUMAN SCH: 100 INJECTION, SOLUTION PARENTERAL at 17:23

## 2017-06-01 RX ADMIN — INSULIN HUMAN SCH: 100 INJECTION, SOLUTION PARENTERAL at 22:00

## 2017-06-01 RX ADMIN — PIPERACILLIN AND TAZOBACTAM SCH MLS/HR: 3; .375 INJECTION, POWDER, LYOPHILIZED, FOR SOLUTION INTRAVENOUS; PARENTERAL at 05:33

## 2017-06-01 RX ADMIN — INSULIN HUMAN SCH: 100 INJECTION, SOLUTION PARENTERAL at 09:57

## 2017-06-01 RX ADMIN — POLYETHYLENE GLYCOL 3350 SCH GM: 17 POWDER, FOR SOLUTION ORAL at 11:20

## 2017-06-01 RX ADMIN — INSULIN HUMAN SCH: 100 INJECTION, SOLUTION PARENTERAL at 13:09

## 2017-06-01 NOTE — CP.PCM.CON
History of Present Illness





- History of Present Illness


History of Present Illness: 


77 year old male with PMH of multiple episodes of small bowel obstruction, TIA, 

CAD, GERD, DM, HTN, S/P appendectomy, S/P cholecystectomy came in because of 

sudden onset abdominal pain and bloating yesterday with some nausea but no 

vomiting. For a time the patient was also not passing gas. He denies fever or 

chills, no nausea or vomiting, no chest pain, no SOB, no dysuria, no diarrhea, 

no cough or colds. In the ED, CT abdomen and pelvis shows possible enteritis 

and colitis. Infectious Diseases consult is requested to further evaluate and 

manage. Currently the patient is feeling better, not in distress, no fevers, 

passing gas, no abdominal pain currently.





Review of Systems





- Review of Systems


All systems: reviewed and no additional remarkable complaints except (as per HPI

)





Past Patient History





- Infectious Disease


Hx of Infectious Diseases: None





- Tetanus Immunizations


Tetanus Immunization: Unknown





- Past Social History


Smoking Status: Never Smoked





- CARDIAC


Hx Pacemaker: No





- PULMONARY


Hx Respiratory Disorders: No





- NEUROLOGICAL


Hx Paralysis: No





- HEENT


Hx HEENT Problems: Yes (WEARS RX GLASSES FOR READING)


Hx Cataracts: Yes (BILATERAL SURGERY)





- RENAL


Hx Chronic Kidney Disease: No





- ENDOCRINE/METABOLIC


Hx Diabetes Mellitus Type 2: Yes





- HEMATOLOGICAL/ONCOLOGICAL


Hx Blood Transfusions: No


Hx Blood Transfusion Reaction: No





- INTEGUMENTARY


Hx Dermatological Problems: No





- MUSCULOSKELETAL/RHEUMATOLOGICAL


Hx Musculoskeletal Disorders: Yes (SCIATICA)





- GASTROINTESTINAL


Hx Gastrointestinal Disorders: Yes (APPENDECTOMY(RUPTURED APPENDIX))


Hx Gall Bladder Disease: Yes (CHOLELITHIASIS)


Other/Comment: Bowel obstruction





- GENITOURINARY/GYNECOLOGICAL


Hx Genitourinary Disorders: Yes


Hx Prostate Problems: Yes





- PSYCHIATRIC


Hx Emotional Abuse: No


Hx Physical Abuse: No


Hx Substance Use: No





- SURGICAL HISTORY


Hx Appendectomy: Yes


Hx Cholecystectomy: Yes





- ANESTHESIA


Hx Anesthesia Reactions: No


Hx Malignant Hyperthermia: No





Meds


Allergies/Adverse Reactions: 


 Allergies











Allergy/AdvReac Type Severity Reaction Status Date / Time


 


No Known Allergies Allergy   Verified 03/05/17 03:24














- Medications


Medications: 


 Current Medications





Aspirin (Ecotrin)  81 mg PO DAILY [facility]


Sodium Chloride (Sodium Chloride 0.45%)  1,000 mls @ 60 mls/hr IV .[id number] [facility]


   Last Admin: 05/31/17 14:07 Dose:  60 mls/hr


Insulin Human Regular (Humulin R Med)  0 units SC ACHS [facility]


   PRN Reason: Protocol


Losartan Potassium (Cozaar)  25 mg PO DAILY [facility]











Physical Exam





- Constitutional


Appears: Non-toxic, No Acute Distress





- Head Exam


Head Exam: NORMAL INSPECTION





- ENT Exam


ENT Exam: Mucous Membranes Moist





- Neck Exam


Neck exam: Negative for: Lymphadenopathy, Meningismus





- Respiratory Exam


Respiratory Exam: Decreased Breath Sounds





- Cardiovascular Exam


Cardiovascular Exam: +S1, +S2





- GI/Abdominal Exam


GI & Abdominal Exam: Soft.  absent: Tenderness





Results





- Vital Signs


Recent Vital Signs: 


 Last Vital Signs











Temp  97.8 F   05/31/17 06:22


 


Pulse  54 L  05/31/17 13:53


 


Resp  16   05/31/17 13:53


 


BP  154/88 H  05/31/17 13:53


 


Pulse Ox  100   05/31/17 13:53














- Labs


Result Diagrams: 


 06/01/17 07:30





 06/01/17 07:30


Labs: 


 Laboratory Results - last 24 hr











  05/31/17 05/31/17 05/31/17





  07:15 07:15 08:00


 


WBC  14.0 H D  


 


RBC  4.24  


 


Hgb  12.7 L  


 


Hct  38.5 L  


 


MCV  90.8  


 


MCH  30.0  


 


MCHC  33.0  


 


RDW  14.3  


 


Plt Count  219  


 


MPV  10.6  


 


Gran %  91.2 H  


 


Lymph % (Auto)  5.9 L  


 


Mono % (Auto)  2.4  


 


Eos % (Auto)  0.4 L  


 


Baso % (Auto)  0.1  


 


Gran #  12.78 H  


 


Lymph #  0.8 L  


 


Mono #  0.3  


 


Eos #  0.1  


 


Baso #  0.01  


 


pO2   198 H 


 


VBG pH   7.44 H 


 


VBG pCO2   37.0 L 


 


VBG HCO3   25.1 


 


VBG Total CO2   26.2 


 


VBG O2 Sat (Calc)   99.5 H 


 


VBG Base Excess   1.1 


 


VBG Potassium   5.5 H 


 


Sodium   139.0  138


 


Chloride   109.0 H  109 H


 


Glucose   147 H 


 


Lactate   1.1 


 


FiO2   21.0 


 


Potassium    4.4


 


Carbon Dioxide    24


 


Anion Gap    9 L


 


BUN    29 H


 


Creatinine    1.3


 


Est GFR ( Amer)    > 60


 


Est GFR (Non-Af Amer)    53


 


POC Glucose (mg/dL)   


 


Random Glucose    125 H


 


Calcium    8.7


 


Total Bilirubin    0.5


 


AST    34


 


ALT    31


 


Alkaline Phosphatase    37 L


 


Total Protein    6.6


 


Albumin    3.5


 


Globulin    3.1


 


Albumin/Globulin Ratio    1.1


 


Lipase    219


 


Venous Blood Potassium   5.5 H 


 


Urine Color   


 


Urine Appearance   


 


Urine pH   


 


Ur Specific Gravity   


 


Urine Protein   


 


Urine Glucose (UA)   


 


Urine Ketones   


 


Urine Blood   


 


Urine Nitrate   


 


Urine Bilirubin   


 


Urine Urobilinogen   


 


Ur Leukocyte Esterase   


 


Urine RBC   


 


Urine WBC   


 


Ur Epithelial Cells   


 


Urine Bacteria   














  05/31/17 05/31/17





  11:30 16:14


 


WBC  


 


RBC  


 


Hgb  


 


Hct  


 


MCV  


 


MCH  


 


MCHC  


 


RDW  


 


Plt Count  


 


MPV  


 


Gran %  


 


Lymph % (Auto)  


 


Mono % (Auto)  


 


Eos % (Auto)  


 


Baso % (Auto)  


 


Gran #  


 


Lymph #  


 


Mono #  


 


Eos #  


 


Baso #  


 


pO2  


 


VBG pH  


 


VBG pCO2  


 


VBG HCO3  


 


VBG Total CO2  


 


VBG O2 Sat (Calc)  


 


VBG Base Excess  


 


VBG Potassium  


 


Sodium  


 


Chloride  


 


Glucose  


 


Lactate  


 


FiO2  


 


Potassium  


 


Carbon Dioxide  


 


Anion Gap  


 


BUN  


 


Creatinine  


 


Est GFR ( Amer)  


 


Est GFR (Non-Af Amer)  


 


POC Glucose (mg/dL)   111 H


 


Random Glucose  


 


Calcium  


 


Total Bilirubin  


 


AST  


 


ALT  


 


Alkaline Phosphatase  


 


Total Protein  


 


Albumin  


 


Globulin  


 


Albumin/Globulin Ratio  


 


Lipase  


 


Venous Blood Potassium  


 


Urine Color  Yellow 


 


Urine Appearance  Clear 


 


Urine pH  7.0 


 


Ur Specific Gravity  1.015 


 


Urine Protein  100 H 


 


Urine Glucose (UA)  Negative 


 


Urine Ketones  Negative 


 


Urine Blood  Negative 


 


Urine Nitrate  Negative 


 


Urine Bilirubin  Negative 


 


Urine Urobilinogen  0.2 


 


Ur Leukocyte Esterase  Negative 


 


Urine RBC  0 - 2 


 


Urine WBC  0 - 2 


 


Ur Epithelial Cells  0 - 2 


 


Urine Bacteria  Trace 














Assessment & Plan





- Assessment and Plan (Free Text)


Plan: 





Assessment


SIRS, consider sepsis due to acute enteritis in patient with multiple episodes 

of small bowel obstruction 


DM


history of cerebrovascular accident


arthritis


history of cholelithiasis


S/P appendectomy





Plan


started patient on Zosyn pending blood cx; will monitor clinical response

## 2017-06-01 NOTE — MRI
PROCEDURE:  Magnetic Resonance Cholangiopancreatography



HISTORY:



COMPARISON:

None available.



TECHNIQUE:

Multiplanar, multisequence MR images of the abdomen were obtained, 

including heavily T2 weighted MRCP images of the biliary system. 

Rotating maximum intensity projection images of the biliary system 

were generated.



FINDINGS:



MRCP:

The extrahepatic common bile duct is dilated measuring up to 11 

millimeters in the pancreatic head. There is no evidence of 

choledocholithiasis. No gross stricture is observed. Findings may be 

secondary to chronic post cholecystectomy state.



LIVER:

 Unremarkable.



GALLBLADDER:

Resected.



SPLEEN:

Unremarkable.



PANCREAS:

 Unremarkable.



ADRENALS:

 Unremarkable.



KIDNEYS:

Unremarkable.



AORTA:

No aneurysm.



ASCITES:

None.



OTHER FINDINGS:

None.



IMPRESSION:

Dilated extrahepatic common bile duct, likely secondary to post 

cholecystectomy state.

## 2017-06-01 NOTE — PN
DATE: 06/01/2017



I saw him yesterday in the Emergency Room.  He is here this morning.  He is 
feeling better.  He is in better spirits.  He says his pain is gone in his 
abdomen.  He does not feel nauseous or vomiting.  He is on IV fluids and IV 
antibiotics.  He is on Cozaar, Ecotrin, insulin, MiraLax, morphine, IV fluids, 
and Zosyn.



PHYSICAL EXAMINATION:

VITAL SIGNS:  Temp 98.2, 53 pulse, 134/71 blood pressure, 20 respiratory rate, 
99% O2 sat on room air.

HEAD:  Atraumatic, normocephalic.

NEUROLOGIC:  Alert and oriented x 3.  Mouth is moist.

NECK:  Supple.

HEART:  Regular rate.

LUNGS:  Clear to auscultation.

ABDOMEN:  Soft, nontender, positive bowel sounds.

EXTREMITIES:  No edema.



LABORATORY DATA:  He has a 6.1 white count, much better than 14 when he came in
, 12.6 hemoglobin, 38.7 hematocrit, 198 platelets.  Sodium 140.  Potassium is 
4.  BUN is 15, creatinine 1.2.  GFR is 58.  Sugar is 90.  Calcium is 8.5.  
Total bili is 1.1.  AST is   ALT is 69.  Alk phos is 48.  Total protein is 6.2.
  Albumin is 3.2.  Globulin is 3.  His liver enzymes went up a little bit.  
Urine is clean.



He is being seen by GI.  



He is here for enteritis, colitis.  



We will check his labs.  Discuss with GI.  Continue with the treatment.  If 
they want to increase him to clears, we will see how he does later, probably he 
will need one more day.  Continue with the IV antibiotics.





__________________________________________

Denny Mendoza DO







cc:   



DD: 06/01/2017 08:41:41  [id number]

TT: 06/01/2017 08:54:15

Confirmation # [id number]

Dictation # [id number]

jn

MTDD

## 2017-06-01 NOTE — HP
I saw him in the Emergency Room on 5/31/17 and I discussed this with the ER 
doctor and his wife at length and the patient.  I was unable to dictate because
, at home, my computer did not work and now it is early in the morning.  I am 
dictating my 5/31 history and physical.  He is a 77-year-old man who I know 
very well from multiple small bowel obstructions who has had an appendectomy, 
cholecystectomy in the past.  He complains of 4 hours of abdominal pain.  They 
felt it was similar to the other episode.  He had nauseousness, nonbloody 
throwing up.  He is comfortable otherwise.  They understand it could be a small 
bowel obstruction.  He comes in.  He has a CAT scan that shows enteritis-type 
picture.



PAST MEDICAL HISTORY:  He has bilateral surgery of cataracts, type 2 diabetes, 
sciatica, appendectomy, cholelithiasis, cholecystectomy.  He has multiple small 
bowel obstructions.  Prostate enlargement.



FAMILY HISTORY:  There is hypertension in the family.



SOCIAL HISTORY:  He never smoked.  He does drink red wine after dinner and no 
substance abuse.



ALLERGIES:  No known drug allergies.



MEDICATIONS:  He is on Diovan for hypertension, metformin for the diabetes and 
Ecotrin.



REVIEW OF SYSTEMS:  No changes in vision or change in hearing.  No headache, no 
dizziness, no chest pain or palpitations.  No shortness breath, no coughing or 
mucus.  He does have abdominal pain.  There is nausea and vomiting.  He is 
moving his bowels.  It is a similar type of pain, but thank God, it is not a 
small-bowel obstruction again.  No changes in urination.  Skin for the most 
part is intact.  No headache, no dizziness, no problems urinating.   depressed 
and anxiety.



PHYSICAL EXAMINATION:

VITAL SIGNS:  He has a 97.8 temp, 50 pulse, 15 respiratory rate, 150/78 blood 
pressure, 100% O2 sat on room air.

GENERAL:  He is well appearing at this time, comfortable,, alert and oriented x 
3.

HEENT:  Head is atraumatic, normocephalic.  Extraocular muscles are intact.  
Pupils equal, reactive to light and accommodation.  Throat is dry.

NECK:  Supple.

HEART:  Regular rate.  Normal S1, S2, 

LUNGS:  Clear to auscultation with poor inspiration.  Decreased breath sounds, 
but no wheezes, rhonchi or rales.

ABDOMEN:  Decreased bowel sounds at this time, mildly distended.  Positive 
tenderness all over, but no guarding, no rebound, no CVA tenderness.

EXTREMITIES:  Have no edema.  

NEUROLOGIC:  He can move all 4 extremities .  GCS is 15.  Cranial nerves II-XII 
grossly intact.

SKIN:  Warm, dry and intact.  Alert and oriented x 3.  Thyroid is midline.  No 
palpable appreciated lymphadenopathy.



LABORATORY DATA:  He had multiple tests.  He has a 138 sodium, potassium 4.4, 
BUN 29, creatinine 1.3, GFR is 53, sugar is 125, calcium is 8.7, total bili is 
0.5, AST is 34, ALT is 31, alk phos is 37, total protein 6.6, albumin 3.5, 
globulin 3.1, lipase is 219.  He had a 198 pO2, 7.44  pH.  White count:  There 
is a 14 white count, 12.7 hemoglobin, 38.5 hematocrit with 219 platelets.  
Urine is clean.  He did have a CAT scan in the ER that showed findings 
consistent with enteritis in the jejunum, luminal narrowing.  No evidence of 
complete obstruction, mild wall thickening in the sigmoid colon, incomplete 
distention, nonspecific inflammatory process of the colon, mild constipation, 
enlarged prostate, possible bladder outlet obstruction.  They recommend a PSA.  
There is dilatation of the pancreatic duct, no pancreatic mass, adrenal 
hyperplasia, bilateral spondylosis.  



He will have consults with gastroenterology and infectious disease.  He will be 
on IV antibiotics, Cozaar, Ecotrin, insulin, IV fluids, Zosyn.  He will have 
morphine for pain.  He should have been made an inpatient.  I discussed that 
when I left the Emergency Room yesterday.  I will change him to an inpatient.  
He is n.p.o.  He is here for enteritis, colitis IV antibiotics, IV fluids, pain 
meds.





__________________________________________

Denny Mendoza DO







cc:   



DD: 06/01/2017 05:48:33  [id number]

TT: 06/01/2017 08:04:05

Job # [id number]

tn



06/01/2017 07:05:40

NINA

## 2017-06-02 VITALS — DIASTOLIC BLOOD PRESSURE: 84 MMHG | OXYGEN SATURATION: 98 % | TEMPERATURE: 98 F | SYSTOLIC BLOOD PRESSURE: 166 MMHG

## 2017-06-02 VITALS — HEART RATE: 60 BPM

## 2017-06-02 LAB
ALBUMIN/GLOB SERPL: 1.1 {RATIO}
ALP SERPL-CCNC: 46 U/L
ALT SERPL-CCNC: 52 U/L
AST SERPL-CCNC: 39 U/L
BILIRUB SERPL-MCNC: 1 MG/DL
BUN SERPL-MCNC: 11 MG/DL
CALCIUM SERPL-MCNC: 8.5 MG/DL
CHLORIDE SERPL-SCNC: 112 MMOL/L
CO2 SERPL-SCNC: 22 MMOL/L
ERYTHROCYTE [DISTWIDTH] IN BLOOD BY AUTOMATED COUNT: 13.9 %
GLOBULIN SER-MCNC: 2.9 GM/DL
GLUCOSE SERPL-MCNC: 82 MG/DL
HCT VFR BLD CALC: 38.9 %
MCH RBC QN AUTO: 29.7 PG
MCHC RBC AUTO-ENTMCNC: 32.6 G/DL
MCV RBC AUTO: 91.1 FL
PLATELET # BLD: 195 10^3/UL
PMV BLD AUTO: 10.5 FL
POTASSIUM SERPL-SCNC: 3.9 MMOL/L
PROT SERPL-MCNC: 6.1 G/DL
SODIUM SERPL-SCNC: 140 MMOL/L
WBC # BLD AUTO: 6.1 10^3/UL

## 2017-06-02 RX ADMIN — POLYETHYLENE GLYCOL 3350 SCH GM: 17 POWDER, FOR SOLUTION ORAL at 09:48

## 2017-06-02 RX ADMIN — PIPERACILLIN AND TAZOBACTAM SCH MLS/HR: 3; .375 INJECTION, POWDER, LYOPHILIZED, FOR SOLUTION INTRAVENOUS; PARENTERAL at 11:34

## 2017-06-02 RX ADMIN — PIPERACILLIN AND TAZOBACTAM SCH MLS/HR: 3; .375 INJECTION, POWDER, LYOPHILIZED, FOR SOLUTION INTRAVENOUS; PARENTERAL at 05:12

## 2017-06-02 RX ADMIN — INSULIN HUMAN SCH UNITS: 100 INJECTION, SOLUTION PARENTERAL at 11:30

## 2017-06-02 RX ADMIN — INSULIN HUMAN SCH: 100 INJECTION, SOLUTION PARENTERAL at 09:47

## 2017-06-02 NOTE — CP.PCM.PN
Subjective





- Date & Time of Evaluation


Date of Evaluation: 06/02/17


Time of Evaluation: 09:55





- Subjective


Subjective: 





Feeling much better, no more abdominal pain, passing gas, able to eat, no 

fevers overnight.





Objective





- Vital Signs/Intake and Output


Vital Signs (last 24 hours): 


 











Temp Pulse Resp BP Pulse Ox


 


 98.0 F   58 L  20   166/84 H  98 


 


 06/02/17 06:00  06/02/17 06:00  06/02/17 06:00  06/02/17 06:00  06/02/17 06:00








Intake and Output: 


 











 06/02/17 06/02/17





 06:59 18:59


 


Intake Total 1100 


 


Output Total 200 


 


Balance 900 














- Medications


Medications: 


 Current Medications





Aspirin (Ecotrin)  81 mg PO DAILY [facility]


   Last Admin: 06/01/17 11:20 Dose:  81 mg


Sodium Chloride (Sodium Chloride 0.45%)  1,000 mls @ 60 mls/hr IV .[id number] [facility]


   Last Admin: 06/02/17 04:13 Dose:  60 mls/hr


Piperacillin Sod/Tazobactam Sod (Zosyn 3.375 In Ns 100ml)  100 mls @ 200 mls/hr 

IVPB Q6 [facility]


   PRN Reason: Protocol


   Stop: 06/07/17 18:01


   Last Admin: 06/02/17 05:12 Dose:  200 mls/hr


Insulin Human Regular (Humulin R Med)  0 units SC ACHS [facility]


   PRN Reason: Protocol


   Last Admin: 06/01/17 22:00 Dose:  Not Given


Losartan Potassium (Cozaar)  25 mg PO DAILY [facility]


   Last Admin: 06/01/17 11:19 Dose:  25 mg


Morphine Sulfate (Morphine)  4 mg IVP Q4H PRN


   PRN Reason: Pain, moderate (4-7)


Polyethylene Glycol (Miralax)  17 gm PO DAILY [facility]


   Last Admin: 06/01/17 11:20 Dose:  17 gm











- Labs


Labs: 


 





 06/02/17 07:00 





 06/02/17 07:00 











- Constitutional


Appears: Non-toxic, No Acute Distress





- Head Exam


Head Exam: NORMAL INSPECTION





- ENT Exam


ENT Exam: Mucous Membranes Moist





- Neck Exam


Neck Exam: absent: Lymphadenopathy, Meningismus





- Respiratory Exam


Respiratory Exam: Decreased Breath Sounds





- Cardiovascular Exam


Cardiovascular Exam: +S1, +S2





- GI/Abdominal Exam


GI & Abdominal Exam: Soft.  absent: Tenderness





Assessment and Plan





- Assessment and Plan (Free Text)


Plan: 





Assessment


SIRS, consider sepsis due to acute enteritis in patient with multiple episodes 

of small bowel obstruction, clinically improving


CBD dilatation without CBC stone, probably post-cholecystectomy


DM


history of cerebrovascular accident


arthritis


history of cholelithiasis


S/P appendectomy





Plan


as discussed with Dr. Mendoza, we can switch antibiotics to Augmentin for 

another 7 days, with outpatient follow up with PMD

## 2017-06-02 NOTE — CP.PCM.PN
<LindainpeterJean - Last Filed: 06/02/17 08:35>





Subjective





- Date & Time of Evaluation


Date of Evaluation: 06/02/17


Time of Evaluation: 07:10





- Subjective


Subjective: 





PGY4 GI Fellow Progress Note


Patient seen and examined bedside this morning. The patient denies any 

complaints at this time. States he is passing flatus, no BM since admission. 

Tolerating liquid diet without pain. No nausea, vomiting, fever, chills.





12 system ROS performed and negative except where stated.





Objective





- Vital Signs/Intake and Output


Vital Signs (last 24 hours): 


 











Temp Pulse Resp BP Pulse Ox


 


 98.0 F   58 L  20   166/84 H  98 


 


 06/02/17 06:00  06/02/17 06:00  06/02/17 06:00  06/02/17 06:00  06/02/17 06:00








Intake and Output: 


 











 06/02/17 06/02/17





 06:59 18:59


 


Intake Total 1100 


 


Output Total 200 


 


Balance 900 














- Medications


Medications: 


 Current Medications





Aspirin (Ecotrin)  81 mg PO DAILY [facility]


   Last Admin: 06/01/17 11:20 Dose:  81 mg


Sodium Chloride (Sodium Chloride 0.45%)  1,000 mls @ 60 mls/hr IV .[id number] [facility]


   Last Admin: 06/02/17 04:13 Dose:  60 mls/hr


Piperacillin Sod/Tazobactam Sod (Zosyn 3.375 In Ns 100ml)  100 mls @ 200 mls/hr 

IVPB Q6 [facility]


   PRN Reason: Protocol


   Stop: 06/07/17 18:01


   Last Admin: 06/02/17 05:12 Dose:  200 mls/hr


Insulin Human Regular (Humulin R Med)  0 units SC ACHS [facility]


   PRN Reason: Protocol


   Last Admin: 06/01/17 22:00 Dose:  Not Given


Losartan Potassium (Cozaar)  25 mg PO DAILY [facility]


   Last Admin: 06/01/17 11:19 Dose:  25 mg


Morphine Sulfate (Morphine)  4 mg IVP Q4H PRN


   PRN Reason: Pain, moderate (4-7)


Polyethylene Glycol (Miralax)  17 gm PO DAILY [facility]


   Last Admin: 06/01/17 11:20 Dose:  17 gm











- Labs


Labs: 


 





 06/02/17 07:00 





 06/02/17 07:00 











- Constitutional


Appears: Non-toxic, No Acute Distress





- Eye Exam


Eye Exam: EOMI, PERRL





- ENT Exam


ENT Exam: Mucous Membranes Moist





- Respiratory Exam


Respiratory Exam: Clear to Ausculation Bilateral.  absent: Rales, Rhonchi, 

Wheezes





- Cardiovascular Exam


Cardiovascular Exam: RRR, +S1, +S2





- GI/Abdominal Exam


GI & Abdominal Exam: Soft, Normal Bowel Sounds.  absent: Distended, Firm, 

Guarding, Rigid, Tenderness, Organomegaly





- Extremities Exam


Extremities Exam: Normal Inspection.  absent: Pedal Edema





- Neurological Exam


Neurological Exam: Alert, Awake, Oriented x3





- Psychiatric Exam


Psychiatric exam: Normal Affect, Normal Mood





- Skin


Skin Exam: Dry, Warm





Assessment and Plan





- Assessment and Plan (Free Text)


Assessment: 





Patient is a 78yo male with PMHx significant for multiple SBO, TIA, CAD (RCA 60

% stenotic on medical management), GERD, DM, HTN who presented to the ED with 

one day of abdominal pain.


-Abdominal pain, resolved


-Questionable mild enteritis/colitis


-CAD


Plan: 


-Advance diet as tolerated


-If patient has no issue with regular diet, OK to D/C from GI standpoint


-Antibiotic coverage per ID


-MRCP reviewed, unremarkable


-Encourage outpatient follow up; patient will require endoscopic evaluation





<Krishna Bellamy - Last Filed: 06/02/17 10:56>





Objective





- Vital Signs/Intake and Output


Vital Signs (last 24 hours): 


 











Temp Pulse Resp BP Pulse Ox


 


 98.0 F   60   20   166/84 H  98 


 


 06/02/17 06:00  06/02/17 09:47  06/02/17 06:00  06/02/17 09:47  06/02/17 06:00








Intake and Output: 


 











 06/02/17 06/02/17





 06:59 18:59


 


Intake Total 1100 


 


Output Total 200 


 


Balance 900 














- Medications


Medications: 


 Current Medications





Aspirin (Ecotrin)  81 mg PO DAILY [facility]


   Last Admin: 06/02/17 09:47 Dose:  81 mg


Sodium Chloride (Sodium Chloride 0.45%)  1,000 mls @ 60 mls/hr IV .[id number] [facility]


   Last Admin: 06/02/17 04:13 Dose:  60 mls/hr


Piperacillin Sod/Tazobactam Sod (Zosyn 3.375 In Ns 100ml)  100 mls @ 200 mls/hr 

IVPB Q6 [facility]


   PRN Reason: Protocol


   Stop: 06/07/17 18:01


   Last Admin: 06/02/17 05:12 Dose:  200 mls/hr


Insulin Human Regular (Humulin R Med)  0 units SC ACHS [facility]


   PRN Reason: Protocol


   Last Admin: 06/02/17 09:47 Dose:  Not Given


Losartan Potassium (Cozaar)  25 mg PO DAILY [facility]


   Last Admin: 06/02/17 09:47 Dose:  25 mg


Morphine Sulfate (Morphine)  4 mg IVP Q4H PRN


   PRN Reason: Pain, moderate (4-7)


Polyethylene Glycol (Miralax)  17 gm PO DAILY [facility]


   Last Admin: 06/02/17 09:48 Dose:  17 gm











- Labs


Labs: 


 





 06/02/17 07:00 





 06/02/17 07:00 











Attending/Attestation





- Attestation


I have personally seen and examined this patient.: Yes


I have fully participated in the care of the patient.: Yes


I have reviewed all pertinent clinical information, including history, physical 

exam and plan: Yes


Notes (Text): 





06/02/17 10:54


I have seen and examined patient with GI fellow.  Patient is seen resting in 

bed comfortably, no acute events overnight.  He denies abdominal pain, nausea, 

vomiting, diarrhea (no bowel movements while in hospital), fever/chills.  

Tolerating PO liquids without difficulty.  Review of vitals from today shows 

elevated BP.





CAD


TIA


DM / HTN


Abdominal pain - resolved, ?colitis





- Advance diet as tolerated


- Continue with antibiotic therapy as per ID


- Patient planned for hospital discharge today, he will require outpatient 

elective colonoscopy following resolution of acute symptoms, particularly since 

he has never had procedure before.  Office contact information provided to 

patient.

## 2017-06-29 ENCOUNTER — HOSPITAL ENCOUNTER (OUTPATIENT)
Dept: HOSPITAL 42 - ENDO | Age: 77
Discharge: HOME | End: 2017-06-29
Payer: MEDICARE

## 2017-06-29 VITALS — BODY MASS INDEX: 21.2 KG/M2

## 2017-06-29 VITALS — OXYGEN SATURATION: 100 % | HEART RATE: 68 BPM

## 2017-06-29 VITALS — DIASTOLIC BLOOD PRESSURE: 80 MMHG | RESPIRATION RATE: 18 BRPM | TEMPERATURE: 97.6 F | SYSTOLIC BLOOD PRESSURE: 145 MMHG

## 2017-06-29 DIAGNOSIS — D12.0: ICD-10-CM

## 2017-06-29 DIAGNOSIS — E11.9: ICD-10-CM

## 2017-06-29 DIAGNOSIS — I10: ICD-10-CM

## 2017-06-29 DIAGNOSIS — K63.5: ICD-10-CM

## 2017-06-29 DIAGNOSIS — D12.3: Primary | ICD-10-CM

## 2017-06-29 DIAGNOSIS — K64.8: ICD-10-CM

## 2017-06-29 DIAGNOSIS — K63.89: ICD-10-CM

## 2017-06-29 PROCEDURE — 45380 COLONOSCOPY AND BIOPSY: CPT

## 2017-06-29 PROCEDURE — 45381 COLONOSCOPY SUBMUCOUS NJX: CPT

## 2017-06-29 PROCEDURE — 45385 COLONOSCOPY W/LESION REMOVAL: CPT

## 2017-06-29 PROCEDURE — 88305 TISSUE EXAM BY PATHOLOGIST: CPT

## 2017-06-29 PROCEDURE — 82948 REAGENT STRIP/BLOOD GLUCOSE: CPT

## 2017-07-06 ENCOUNTER — HOSPITAL ENCOUNTER (EMERGENCY)
Dept: HOSPITAL 42 - ED | Age: 77
LOS: 1 days | Discharge: HOME | End: 2017-07-07
Payer: MEDICARE

## 2017-07-06 VITALS — BODY MASS INDEX: 21.2 KG/M2

## 2017-07-06 DIAGNOSIS — Z86.73: ICD-10-CM

## 2017-07-06 DIAGNOSIS — K62.5: Primary | ICD-10-CM

## 2017-07-06 DIAGNOSIS — K56.60: ICD-10-CM

## 2017-07-06 DIAGNOSIS — I10: ICD-10-CM

## 2017-07-06 PROCEDURE — 99284 EMERGENCY DEPT VISIT MOD MDM: CPT

## 2017-07-06 PROCEDURE — 74177 CT ABD & PELVIS W/CONTRAST: CPT

## 2017-07-06 PROCEDURE — 82948 REAGENT STRIP/BLOOD GLUCOSE: CPT

## 2017-07-06 PROCEDURE — 93005 ELECTROCARDIOGRAM TRACING: CPT

## 2017-07-06 PROCEDURE — 80053 COMPREHEN METABOLIC PANEL: CPT

## 2017-07-06 PROCEDURE — 83690 ASSAY OF LIPASE: CPT

## 2017-07-06 PROCEDURE — 85025 COMPLETE CBC W/AUTO DIFF WBC: CPT

## 2017-07-06 PROCEDURE — 85730 THROMBOPLASTIN TIME PARTIAL: CPT

## 2017-07-06 PROCEDURE — 85610 PROTHROMBIN TIME: CPT

## 2017-07-06 PROCEDURE — 87086 URINE CULTURE/COLONY COUNT: CPT

## 2017-07-06 PROCEDURE — 81001 URINALYSIS AUTO W/SCOPE: CPT

## 2017-07-06 PROCEDURE — 96360 HYDRATION IV INFUSION INIT: CPT

## 2017-07-07 VITALS — DIASTOLIC BLOOD PRESSURE: 78 MMHG | SYSTOLIC BLOOD PRESSURE: 130 MMHG | OXYGEN SATURATION: 99 % | HEART RATE: 62 BPM

## 2017-07-07 VITALS — RESPIRATION RATE: 18 BRPM

## 2017-07-07 LAB
ALBUMIN SERPL-MCNC: 3.6 G/DL
ALBUMIN/GLOB SERPL: 1.2 {RATIO}
ALT SERPL-CCNC: 38 U/L
APPEARANCE UR: (no result)
APTT BLD: 25.8 SECONDS
AST SERPL-CCNC: 33 U/L
BACTERIA #/AREA URNS HPF: (no result) /[HPF]
BASOPHILS # BLD AUTO: 0.02 K/MM3
BASOPHILS NFR BLD: 0.3 %
BILIRUB UR-MCNC: NEGATIVE MG/DL
BUN SERPL-MCNC: 32 MG/DL
CALCIUM SERPL-MCNC: 9.2 MG/DL
COLOR UR: YELLOW
EOSINOPHIL # BLD: 0.3 10*3/UL
EOSINOPHIL NFR BLD: 3.5 %
EPI CELLS #/AREA URNS HPF: (no result) /HPF
ERYTHROCYTE [DISTWIDTH] IN BLOOD BY AUTOMATED COUNT: 13.7 %
GFR NON-AFRICAN AMERICAN: 53
GLUCOSE UR STRIP-MCNC: NEGATIVE MG/DL
GRANULOCYTES # BLD: 5.51 10*3/UL
GRANULOCYTES NFR BLD: 73.2 %
HGB BLD-MCNC: 12.2 GM/DL
INR PPP: 0.95
LEUKOCYTE ESTERASE UR-ACNC: (no result) LEU/UL
LIPASE SERPL-CCNC: 236 U/L
LYMPHOCYTES # BLD: 1.3 10*3/UL
LYMPHOCYTES NFR BLD AUTO: 17.7 %
MCH RBC QN AUTO: 30.3 PG
MCHC RBC AUTO-ENTMCNC: 33.1 G/DL
MCV RBC AUTO: 91.6 FL
MONOCYTES # BLD AUTO: 0.4 10*3/UL
MONOCYTES NFR BLD: 5.3 %
PH UR STRIP: 7 [PH]
PLATELET # BLD: 203 10^3/UL
PMV BLD AUTO: 10.6 FL
PROT UR STRIP-MCNC: (no result) MG/DL
PROTHROMBIN TIME: 10.3 SECONDS
RBC # BLD AUTO: 4.03 10^6/UL
RBC # UR STRIP: NEGATIVE /UL
RBC #/AREA URNS HPF: (no result) /HPF
SP GR UR STRIP: 1.01
URINE NITRATE: NEGATIVE
UROBILINOGEN UR STRIP-ACNC: 0.2 E.U./DL
WBC # BLD AUTO: 7.5 10^3/UL
WBC #/AREA URNS HPF: (no result) /HPF

## 2017-07-07 NOTE — CARD
--------------- APPROVED REPORT --------------





EKG Measurement

Heart [id number]

MA 120P39

[id number]

[id number]

[id number]



<Conclusion>

Normal sinus rhythm

LVH by voltage, possible normal variant

No change

## 2017-07-07 NOTE — CT
EXAM:

  CT Abdomen and Pelvis With Intravenous Contrast



CLINICAL HISTORY:

  77 years old, male; Pain; Abdominal pain; Additional info: Blood in stool - 

h/o abnormal abd. Ct's in past



TECHNIQUE:

  Axial computed tomography images of the abdomen and pelvis with intravenous 

contrast.  This CT exam was performed using one or more of the following dose 

reduction techniques:  automated exposure control, adjustment of the mA and/or 

kV according to patient size, and/or use of iterative reconstruction technique.

  Coronal and sagittal reformatted images were created and reviewed.



CONTRAST:

  100 mL of OMNIPAQUE administered intravenously.



COMPARISON:

  CT - ABD   PELVIS IV CONTRAST ONLY 5/31/2017 9:47:11 AM



FINDINGS:

  Lower thorax:  Borderline cardiomegaly.  Minimal atelectasis/scarring.



 ABDOMEN:

  Liver:  Mild intrahepatic ductal dilatation, similar to previous examination.

  Gallbladder and bile ducts:  Cholecystectomy.  Mild extrahepatic ductal 

dilatation, similar to previous examination.

  Pancreas:  No discrete mass.  Mild pancreatic ductal dilatation.

  Spleen:  Too small to characterize lesion.  No splenomegaly.

  Adrenals:  Mild hypertrophy of adrenal glands.

  Kidneys and ureters:  No mass.  Mild pelvocaliectasis of both kidneys.

  Stomach and bowel:  Moderate mural thickening versus underdistention of 

stomach.  No definite bowel wall thickening.  No obstruction.

  Appendix:  No findings to suggest acute appendicitis.



 PELVIS:

  Bladder:  Unremarkable.

  Reproductive:  Enlarged, heterogeneous prostate gland.



 ABDOMEN and PELVIS:

  Intraperitoneal space:  No significant fluid collection.  No free air.

  Bones/joints:  Chronic L5 pars defects with anterolisthesis.  Mild 

degenerative changes of spine.

  Soft tissues:  Unremarkable.

  Vasculature:  Mild atherosclerotic disease.  No aneurysm.

  Lymph nodes:  No pathologically enlarged lymph nodes.



IMPRESSION:     

1.  Gastric wall thickening versus underdistention.  Clinical correlation is 

needed.

2.  Biliary/pancreatic ductal dilatation, similar to previous examination.

3.  Prostate enlargement.  Followup as clinically warranted.

4.  Incidental/non-acute findings are described above.

## 2017-07-07 NOTE — ED PDOC
Arrival/HPI





- General


Historian: Patient





- History of Present Illness


Time/Duration: 4-6 hours


Symptom Onset: Sudden


Symptom Course: Unchanged





- General


Chief Complaint: GI Problem


Time Seen by Provider: 07/06/17 23:57





- History of Present Illness


Narrative History of Present Illness (Text): 





07/07/17 01:07


Mr. Tompkins is a 78 yo male with PMH significant for small bowel obstruction, 

enteritis, and colitis presents to the emergency department complaining of 

bright red blood in his stool.  He noticed the blood in his stool this evening 

around 22:00. He denies any clots or dark blood in his stool. He indicated that 

last week he did have a colonoscopy with removal of polyps. He is seen by Dr. Drake and is scheduled for endoscopy 7/13/2017. 





 (RAIN THOMPSON)





Past Medical History





- Provider Review


Nursing Documentation Reviewed: Yes





- Infectious Disease


Hx of Infectious Diseases: None





- Tetanus Immunization


Tetanus Immunization: Unknown





- Reproductive


Currently Pregnant: No





- Cardiac


Hx Hypertension: Yes


Hx Pacemaker: No





- Pulmonary


Hx Respiratory Disorders: No





- Neurological


HX Cerebrovascular Accident: Yes (Rt. sided)


Hx Paralysis: No





- HEENT


Hx HEENT Disorder: Yes (WEARS RX GLASSES FOR READING)


Hx Cataracts: Yes (BILATERAL SURGERY)





- Renal


Hx Renal Disorder: No





- Endocrine/Metabolic


Hx Diabetes Mellitus Type 2: Yes





- Hematological/Oncological


Hx Blood Transfusions: No


Hx Blood Transfusion Reaction: No





- Integumentary


Hx Dermatological Disorder: No





- Musculoskeletal/Rheumatological


Hx Musculoskeletal Disorders: Yes (SCIATICA)





- Gastrointestinal


Hx Gastrointestinal Disorders: Yes (APPENDECTOMY(RUPTURED APPENDIX))


Hx Gall Bladder Disease: Yes (CHOLELITHIASIS)


Other/Comment: Bowel obstruction





- Genitourinary/Gynecological


Hx Genitourinary Disorders: Yes


Hx Prostate Problems: Yes





- Psychiatric


Hx Emotional Abuse: No


Hx Physical Abuse: No


Hx Substance Use: No





- Past Surgical History


Past Surgical History: No Previous





- Surgical History


Hx Appendectomy: Yes


Hx Cholecystectomy: Yes





- Anesthesia


Hx Anesthesia Reactions: No


Hx Malignant Hyperthermia: No





- Suicidal Assessment


Feels Threatened In Home Enviroment: No





Family/Social History





- Physician Review


Nursing Documentation Reviewed: Yes


Family/Social History: No Known Family HX


Smoking Status: Never Smoked


Hx Alcohol Use: Yes (RED WINE AFTER DINNER)


Hx Substance Use: No


Hx Substance Use Treatment: No





Allergies/Home Meds


Allergies/Adverse Reactions: 


Allergies





No Known Allergies Allergy (Verified 07/06/17 23:43)


 








Home Medications: 


 Home Meds











 Medication  Instructions  Recorded  Confirmed


 


Valsartan [Diovan] 40 mg PO QPM 11/30/16 07/06/17


 


metFORMIN [glucOPHAGE] 500 mg PO PRN PRN 11/30/16 07/06/17


 


Aspirin [Ecotrin] 81 mg PO QPM 03/23/17 07/06/17


 


Omega-3-Acid Ethyl Esters [OMEGA 3] 500 mg PO QAM 06/22/17 07/06/17


 


Ubidecarenone [Co Q-10] 10 mg PO QAM 06/22/17 07/06/17


 


Polyethylene Glycol 3350 [Miralax] 17 gm PO QPM 07/03/17 07/06/17














Review of Systems





- Review of Systems


Constitutional: absent: Fatigue, Weight Change, Fevers


Eyes: absent: Vision Changes, Photophobia


Respiratory: absent: SOB, Cough, Sputum, Wheezing


Cardiovascular: absent: Chest Pain, Palpitations, Edema


Gastrointestinal: Diarrhea, Hematochezia.  absent: Abdominal Pain, Appetite 

Changes


Genitourinary Male: absent: Dysuria, Hematuria


Musculoskeletal: Normal


Skin: absent: Rash, Pruritis


Neurological: absent: Headache, Dizziness, Focal Weakness


Endocrine: Normal.  absent: Diaphoresis, Polyuria


Hemo/Lymphatic: absent: Easy Bleeding, Easy Bruising


Psychiatric: absent: Anxiety, Depression





Physical Exam


Vital Signs Reviewed: Yes


Temperature: Afebrile


Blood Pressure: Normal


Pulse: Regular


Respiratory Rate: Normal


Appearance: Positive for: Well-Appearing


Pain Distress: None


Mental Status: Positive for: Alert and Oriented X 3


Finger Stick Blood Glucose: 160





- Systems Exam


Head: Present: Atraumatic, Normocephalic


Pupils: Present: PERRL


Extroacular Muscles: Present: EOMI


Conjunctiva: Present: Normal


Mouth: Present: Moist Mucous Membranes


Respiratory/Chest: Present: Clear to Auscultation, Good Air Exchange, 

Respiratory Distress


Cardiovascular: Present: Regular Rate and Rhythm, Normal S1, S2.  No: Murmurs


Abdomen: Present: Tenderness, Normal Bowel Sounds.  No: Distention, Peritoneal 

Signs, Rebound, Guarding


Rectal: Present: Normal Rectal Tone.  No: Hemorrhoids, Fissures


Upper Extremity: Present: Normal Inspection, Normal ROM.  No: Cyanosis, Edema


Lower Extremity: Present: Normal Inspection, NORMAL PULSES.  No: Edema


Neurological: Present: GCS=15, CN II-XII Intact, Speech Normal


Skin: Present: Warm, Dry.  No: Rashes


Psychiatric: Present: Alert, Oriented x 3, Normal Insight, Normal Concentration





Medical Decision Making


Reassessment Condition: Improved





- Lab Interpretations


I have reviewed the lab results: Yes





- EKG Interpretation


Interpreted by ED Physician: Yes


ED Course and Treatment: 





07/07/17 06:06


Impression:





Mr. Tompkins is a 77 year old male who complains of rectal bleeding beginning 

the evening of 7/6/2017.





Differential Diagnosis included but are not limited to:  





Diverticulitis vs. colitis vs. internal hemorrhoids 





Plan:





- FOBT


- Urinalysis


- Labs


- CT scan of abdomen 


- Reassess and disposition





Progress Notes:


 (RAIN THOMPSON)





- Lab Interpretations


Lab Results: 











 07/07/17 00:15 





 07/07/17 00:15 





 Lab Results





07/07/17 00:15: Sodium 136, Potassium 5.2 H, Chloride 103, Carbon Dioxide 27, 

Anion Gap 11, BUN 32 H, Creatinine 1.3, Est GFR (African Amer) > 60, Est GFR (

Non-Af Amer) 53, Random Glucose 125 H, Calcium 9.2, Total Bilirubin 0.3, AST 33

, ALT 38, Alkaline Phosphatase 34 L, Total Protein 6.7, Albumin 3.6, Globulin 

3.1, Albumin/Globulin Ratio 1.2, Lipase 236


07/07/17 00:15: PT 10.3, INR 0.95, APTT 25.8


07/07/17 00:15: WBC 7.5  D, RBC 4.03, Hgb 12.2 L, Hct 36.9 L, MCV 91.6, MCH 30.3

, MCHC 33.1, RDW 13.7, Plt Count 203, MPV 10.6, Gran % 73.2 H, Lymph % (Auto) 

17.7 L, Mono % (Auto) 5.3, Eos % (Auto) 3.5, Baso % (Auto) 0.3, Gran # 5.51, 

Lymph # 1.3, Mono # 0.4, Eos # 0.3, Baso # 0.02


07/07/17 00:05: Urine Color Yellow, Urine Appearance Sl cloudy, Urine pH 7.0, 

Ur Specific Gravity 1.010, Urine Protein Trace H, Urine Glucose (UA) Negative, 

Urine Ketones Negative, Urine Blood Negative, Urine Nitrate Negative, Urine 

Bilirubin Negative, Urine Urobilinogen 0.2, Ur Leukocyte Esterase Trace H, 

Urine RBC 0 - 2, Urine WBC 0 - 2, Ur Epithelial Cells 0 - 2, Urine Bacteria Rare


07/06/17 23:39: POC Glucose (mg/dL) 162 H











- RAD Interpretation


Radiology Orders: 











07/07/17 00:13


ABDOMEN & PELVIS [ABD PELVIS PO & IV CONTRAST] [CT] Stat 














- Medication Orders


Current Medication Orders: 














Discontinued Medications





Sodium Chloride (Sodium Chloride 0.9%)  1,000 mls @ 125 mls/hr IV .Q8H [facility]


   Stop: 07/07/17 07:57


   Last Admin: 07/07/17 00:19  Dose: 125 mls/hr





Iohexol (Omnipaque 350 100 Ml) Confirm Administered Dose 350 mg .ROUTE .STK-MED 

ONE


   Stop: 07/07/17 01:17


Iohexol (Omnipaque 240 (50 Ml)) Confirm Administered Dose 50 ml .ROUTE .STK-MED 

ONE


   Stop: 07/07/17 01:18











- PA / NP / Resident Statement


MD/ has reviewed & agrees with the documentation as recorded.


MD/DO has examined the patient and agrees with the treatment plan.





Disposition/Present on Arrival





- Present on Arrival


Any Indicators Present on Arrival: No


History of DVT/PE: No


History of Uncontrolled Diabetes: No


Urinary Catheter: No


History of Decub. Ulcer: No


History Surgical Site Infection Following: None





- Disposition


Have Diagnosis and Disposition been Completed?: Yes


Disposition Time: 06:11





- Disposition


Diagnosis: 


 Rectal bleeding





Disposition: HOME/ ROUTINE


Condition: STABLE


Discharge Instructions (ExitCare):  Rectal Bleeding (ED)


Additional Instructions: 





Thank you for letting us take care of you today. Your provider was Dr. Clemons. You were treated for rectal bleeding. The emergency medical care you 

received today was directed at your acute symptoms. If you were prescribed any 

medication, please fill it and take as directed. It may take several days for 

your symptoms to resolve. Return to the Emergency Department if your symptoms 

worsen, do not improve, or if you have any other problems.





Please contact your doctor or call one of the physicians/clinics you have been 

referred to that are listed on the Patient Visit Information form that is 

included in your discharge packet. Bring any paperwork you were given at 

discharge with you along with any medications you are taking to your follow up 

visit. Our treatment cannot replace ongoing medical care by a primary care 

provider (PCP) outside of the emergency department.





Thank you for allowing the [facility] team to be part of your care today.














Follow up with your primary doctor in 2-3 days for re-evaluation.


Referrals: 


Denny Mendoza, DO [Family Provider] - Follow up with primary

## 2017-07-13 ENCOUNTER — HOSPITAL ENCOUNTER (OUTPATIENT)
Dept: HOSPITAL 42 - ENDO | Age: 77
Discharge: HOME | End: 2017-07-13
Payer: MEDICARE

## 2017-07-13 VITALS
SYSTOLIC BLOOD PRESSURE: 136 MMHG | RESPIRATION RATE: 16 BRPM | TEMPERATURE: 97.5 F | DIASTOLIC BLOOD PRESSURE: 73 MMHG | OXYGEN SATURATION: 100 % | HEART RATE: 49 BPM

## 2017-07-13 VITALS — BODY MASS INDEX: 21.2 KG/M2

## 2017-07-13 DIAGNOSIS — K83.8: ICD-10-CM

## 2017-07-13 DIAGNOSIS — K86.89: ICD-10-CM

## 2017-07-13 DIAGNOSIS — K29.70: Primary | ICD-10-CM

## 2017-07-13 LAB
ALBUMIN SERPL-MCNC: 4 G/DL (ref 3–4.8)
ALBUMIN/GLOB SERPL: 1.2 {RATIO} (ref 1.1–1.8)
ALT SERPL-CCNC: 41 U/L (ref 7–56)
APTT BLD: 26 SECONDS (ref 23.7–30.8)
AST SERPL-CCNC: 40 U/L (ref 15–59)
BASOPHILS # BLD AUTO: 0.01 K/MM3 (ref 0–2)
BASOPHILS NFR BLD: 0.1 % (ref 0–3)
BUN SERPL-MCNC: 19 MG/DL (ref 7–21)
CALCIUM SERPL-MCNC: 9.2 MG/DL (ref 8.4–10.5)
EOSINOPHIL # BLD: 0.2 10*3/UL (ref 0–0.7)
EOSINOPHIL NFR BLD: 2.8 % (ref 1.5–5)
ERYTHROCYTE [DISTWIDTH] IN BLOOD BY AUTOMATED COUNT: 13.5 % (ref 11.5–14.5)
GFR NON-AFRICAN AMERICAN: 53
GRANULOCYTES # BLD: 4.69 10*3/UL (ref 1.4–6.5)
GRANULOCYTES NFR BLD: 66.5 % (ref 50–68)
HGB BLD-MCNC: 11.3 GM/DL (ref 14–18)
INR PPP: 0.93 (ref 0.93–1.08)
LYMPHOCYTES # BLD: 1.8 10*3/UL (ref 1.2–3.4)
LYMPHOCYTES NFR BLD AUTO: 25.4 % (ref 22–35)
MCH RBC QN AUTO: 30.2 PG (ref 25–35)
MCHC RBC AUTO-ENTMCNC: 32.9 G/DL (ref 31–37)
MCV RBC AUTO: 91.7 FL (ref 80–105)
MONOCYTES # BLD AUTO: 0.4 10*3/UL (ref 0.1–0.6)
MONOCYTES NFR BLD: 5.2 % (ref 1–6)
PLATELET # BLD: 227 10^3/UL (ref 120–450)
PMV BLD AUTO: 10.4 FL (ref 7–11)
PROTHROMBIN TIME: 10 SECONDS (ref 9.9–11.8)
RBC # BLD AUTO: 3.74 10^6/UL (ref 3.5–6.1)
WBC # BLD AUTO: 7.1 10^3/UL (ref 4.5–11)

## 2017-07-13 PROCEDURE — 88305 TISSUE EXAM BY PATHOLOGIST: CPT

## 2017-07-13 PROCEDURE — 88342 IMHCHEM/IMCYTCHM 1ST ANTB: CPT

## 2017-07-13 PROCEDURE — 43237 ENDOSCOPIC US EXAM ESOPH: CPT

## 2017-07-13 PROCEDURE — 85025 COMPLETE CBC W/AUTO DIFF WBC: CPT

## 2017-07-13 PROCEDURE — 85730 THROMBOPLASTIN TIME PARTIAL: CPT

## 2017-07-13 PROCEDURE — 80053 COMPREHEN METABOLIC PANEL: CPT

## 2017-07-13 PROCEDURE — 85610 PROTHROMBIN TIME: CPT

## 2017-07-13 PROCEDURE — 43239 EGD BIOPSY SINGLE/MULTIPLE: CPT

## 2017-07-13 PROCEDURE — 36415 COLL VENOUS BLD VENIPUNCTURE: CPT

## 2018-01-11 NOTE — DS
Cheek Interpolation Flap Text: A decision was made to reconstruct the defect utilizing an interpolation axial flap and a staged reconstruction. A telfa template was made of the defect. This telfa template was then used to outline the Cheek Interpolation flap. The donor area for the pedicle flap was then injected with anesthesia. The flap was excised through the skin and subcutaneous tissue down to the layer of the underlying musculature. The interpolation flap was carefully excised within this deep plane to maintain its blood supply. The edges of the donor site were undermined. The donor site was closed in a primary fashion. The pedicle was then rotated into position and sutured. Once the tube was sutured into place, adequate blood supply was confirmed with blanching and refill. The pedicle was then wrapped with xeroform gauze and dressed appropriately with a telfa and gauze bandage to ensure continued blood supply and protect the attached pedicle. I saw him this morning in his room in [facility].  He is comfortable in bed, slept well.  He tells me he is 
passing gas.  The gastroenterologist had seen him and said he is good to go.  I spoke to the infectio
us disease doctor.  They put him on Augmentin 875.  He is going to eat breakfast and lunch today.  If
 he does well with breakfast and lunch, and has no nausea, vomiting, abdominal pain, we will discharg
e him today.



He is currently on IV fluids, Cozaar, Ecotrin, insulin coverage, MiraLax, morphine for pain and Zosyn
.



PHYSICAL EXAMINATION:

VITAL SIGNS:  Temp 97.8, 52 pulse, 137/72 blood pressure, 20 respiratory rate and 100% O2 sat on room
 air.

HEENT:  His head is atraumatic, normocephalic.  Throat is moist.

NECK:  Supple.

HEART:  Regular rate.

LUNGS:  Clear to auscultation.

ABDOMEN:  Soft, nontender, positive bowel sounds, no guarding, no rebound, no CVA tenderness.

EXTREMITIES:  Have no edema.



He has a 6.1 white count, 12.7 hemoglobin, 38.9 hematocrit with 195 platelets.  Sodium 140, 4 potassi
um, BUN 50, creatinine 1.2, GFR is greater than 60, sugar is 58, calcium is 8.5, total bili is 1.1, A
ST is 63, ALT is 69, alk phos 48, total protein 6.2, albumin is 3.2, globulin 3.  Hepatitis screen wa
s negative.



If he eats well for breakfast and lunch, he will be discharged after lunch.  He was here for enteriti
s, colitis, abdominal pain.  He is a diabetic.  He will follow up with Dr. Mendoza on the outpatient. 
 He has to get a colonoscopy in the next week to 2 weeks with the GI guys.





__________________________________________

Denny Mendoza DO







cc:



DD: 06/02/2017 08:04:18  [id number]

TT: 06/02/2017 11:37:53

Job # [id number]

en Closure 3 Information: This tab is for additional flaps and grafts above and beyond our usual structured repairs. Please note if you enter information here it will not currently bill and you will need to add the billing information manually. X Size Of Lesion In Cm (Optional): 0.8 Stage 13: Number Of Blocks?: 0 Patient Will Remove Sutures At Home?: No Incorporate Mauc Into Note After Indications: Yes Xenograft Text: The defect edges were debeveled with a #15 scalpel blade. Given the location of the defect, shape of the defect and the proximity to free margins a xenograft was deemed most appropriate. The graft was then trimmed to fit the size of the defect. The graft was then placed in the primary defect and oriented appropriately. Consent (Scalp)/Introductory Paragraph: The rationale for Mohs was explained to the patient and consent was obtained. The risks, benefits and alternatives to therapy were discussed in detail. Specifically, the risks of changes in hair growth pattern secondary to repair, infection, scarring, bleeding, prolonged wound healing, incomplete removal, allergy to anesthesia, nerve injury and recurrence were addressed. Prior to the procedure, the treatment site was clearly identified and confirmed by the patient. All components of Universal Protocol/PAUSE Rule completed. Referred To Asc For Closure Text (Leave Blank If You Do Not Want): After obtaining clear surgical margins the patient was sent to an [facility] for surgical repair. The patient understands they will receive post-surgical care and follow-up from the [facility] physician. Afx Histology Text: there is a poorly differentiated spindled cell neoplasm composed of fascicles of atypical spindled and epithelioid cells, infiltrating and replacing papillary and reticular dermis. Mitotic activity is brisk, including atypical forms. The lesion is present on a sun-damaged skin. This pattern supports the diagnosis of atypical fibrous xanthoma. Closure 2 Information: This tab is for additional flaps and grafts, including complex repair and grafts and complex repair and flaps. You can also specify a different location for the additional defect, if the location is the same you do not need to select a new one. We will insert the automated text for the repair you select below just as we do for solitary flaps and grafts. Please note that at this time if you select a location with a different insurance zone you will need to override the ICD10 and CPT if appropriate. Consent (Spinal Accessory)/Introductory Paragraph: The rationale for Mohs was explained to the patient and consent was obtained. The risks, benefits and alternatives to therapy were discussed in detail. Specifically, the risks of damage to the spinal accessory nerve, infection, scarring, bleeding, prolonged wound healing, incomplete removal, allergy to anesthesia, and recurrence were addressed. Prior to the procedure, the treatment site was clearly identified and confirmed by the patient. All components of Universal Protocol/PAUSE Rule completed. Crescentic Intermediate Repair Preamble Text (Leave Blank If You Do Not Want): Undermining was performed with blunt dissection. Advancement-Rotation Flap Text: The defect edges were debeveled with a #15 scalpel blade. Given the location of the defect, shape of the defect and the proximity to free margins an advancement-rotation flap was deemed most appropriate. Using a sterile surgical marker, an appropriate flap was drawn incorporating the defect and placing the expected incisions within the relaxed skin tension lines where possible. The area thus outlined was incised deep to adipose tissue with a #15 scalpel blade. The skin margins were undermined to an appropriate distance in all directions utilizing iris scissors. O-T Plasty Text: The defect edges were debeveled with a #15 scalpel blade. Given the location of the defect, shape of the defect and the proximity to free margins an O-T plasty was deemed most appropriate. Using a sterile surgical marker, an appropriate O-T plasty was drawn incorporating the defect and placing the expected incisions within the relaxed skin tension lines where possible. The area thus outlined was incised deep to adipose tissue with a #15 scalpel blade. The skin margins were undermined to an appropriate distance in all directions utilizing iris scissors. Repair Performed By Another Provider Text (Leave Blank If You Do Not Want): After obtaining clear surgical margins the defect was repaired by another provider. Scc In Situ Histology Text: There is hyperkeratosis, acanthosis, and cytologic atypia of keratinocytes with hyperchromatic and pleomorphic nuclei throughout the full thickness of the epidermis. No dermal invasion is seen. Chronic inflammation is present in the dermis. Dorsal Nasal Flap Text: The defect edges were debeveled with a #15 scalpel blade. Given the location of the defect and the proximity to free margins a dorsal nasal flap was deemed most appropriate. Using a sterile surgical marker, an appropriate dorsal nasal flap was drawn around the defect. The area thus outlined was incised deep to adipose tissue with a #15 scalpel blade. The skin margins were undermined to an appropriate distance in all directions utilizing iris scissors. Dressing: dry sterile dressing Complex Repair And Graft Additional Text (Will Appearing After The Standard Complex Repair Text): The complex repair was not sufficient to completely close the primary defect. The remaining additional defect was repaired with the graft mentioned below. Manual Repair Warning Statement: We plan on removing the manually selected variable below in favor of our much easier automatic structured text blocks found in the previous tab. We decided to do this to help make the flow better and give you the full power of structured data. Manual selection is never going to be ideal in our platform and I would encourage you to avoid using manual selection from this point on, especially since I will be sunsetting this feature. It is important that you do one of two things with the customized text below. First, you can save all of the text in a word file so you can have it for future reference. Second, transfer the text to the appropriate area in the Library tab. Lastly, if there is a flap or graft type which we do not have you need to let us know right away so I can add it in before the variable is hidden. No need to panic, we plan to give you roughly 6 months to make the change. Transposition Flap Text: The defect edges were debeveled with a #15 scalpel blade. Given the location of the defect and the proximity to free margins a transposition flap was deemed most appropriate. Using a sterile surgical marker, an appropriate transposition flap was drawn incorporating the defect. The area thus outlined was incised deep to adipose tissue with a #15 scalpel blade. The skin margins were undermined to an appropriate distance in all directions utilizing iris scissors. Ear Wedge Repair Text: A wedge excision was completed by carrying down an excision through the full thickness of the ear and cartilage with an inward facing Burow's triangle. The wound was then closed in a layered fashion. Bilobed Flap Text: The defect edges were debeveled with a #15 scalpel blade. Given the location of the defect and the proximity to free margins a bilobe flap was deemed most appropriate. Using a sterile surgical marker, an appropriate bilobe flap drawn around the defect. The area thus outlined was incised deep to adipose tissue with a #15 scalpel blade. The skin margins were undermined to an appropriate distance in all directions utilizing iris scissors. A-T Advancement Flap Text: The defect edges were debeveled with a #15 scalpel blade. Given the location of the defect, shape of the defect and the proximity to free margins an A-T advancement flap was deemed most appropriate. Using a sterile surgical marker, an appropriate advancement flap was drawn incorporating the defect and placing the expected incisions within the relaxed skin tension lines where possible. The area thus outlined was incised deep to adipose tissue with a #15 scalpel blade. The skin margins were undermined to an appropriate distance in all directions utilizing iris scissors. Stage 4: Additional Anesthesia Type: 1% lidocaine with epinephrine Bilateral Helical Rim Advancement Flap Text: The defect edges were debeveled with a #15 blade scalpel. Given the location of the defect and the proximity to free margins (helical rim) a bilateral helical rim advancement flap was deemed most appropriate. Using a sterile surgical marker, the appropriate advancement flaps were drawn incorporating the defect and placing the expected incisions between the helical rim and antihelix where possible. The area thus outlined was incised through and through with a #15 scalpel blade. With a skin hook and iris scissors, the flaps were gently and sharply undermined and freed up. Consent (Nose)/Introductory Paragraph: The rationale for Mohs was explained to the patient and consent was obtained. The risks, benefits and alternatives to therapy were discussed in detail. Specifically, the risks of nasal deformity, changes in the flow of air through the nose, infection, scarring, bleeding, prolonged wound healing, incomplete removal, allergy to anesthesia, nerve injury and recurrence were addressed. Prior to the procedure, the treatment site was clearly identified and confirmed by the patient. All components of Universal Protocol/PAUSE Rule completed. Ftsg Text: The defect edges were debeveled with a #15 scalpel blade. Given the location of the defect, shape of the defect and the proximity to free margins a full thickness skin graft was deemed most appropriate. Using a sterile surgical marker, the primary defect shape was transferred to the donor site. The area thus outlined was incised deep to adipose tissue with a #15 scalpel blade. The harvested graft was then trimmed of adipose tissue until only dermis and epidermis was left. The skin margins of the secondary defect were undermined to an appropriate distance in all directions utilizing iris scissors. The secondary defect was closed with interrupted buried subcutaneous sutures. The skin edges were then re-apposed with running  sutures. The skin graft was then placed in the primary defect and oriented appropriately. Alternatives Discussed Intro (Do Not Add Period): I discussed alternative treatments to Mohs surgery and specifically discussed the risks and benefits of Hemostasis: Electrocautery Consent (Ear)/Introductory Paragraph: The rationale for Mohs was explained to the patient and consent was obtained. The risks, benefits and alternatives to therapy were discussed in detail. Specifically, the risks of ear deformity, infection, scarring, bleeding, prolonged wound healing, incomplete removal, allergy to anesthesia, nerve injury and recurrence were addressed. Prior to the procedure, the treatment site was clearly identified and confirmed by the patient. All components of Universal Protocol/PAUSE Rule completed. Referred To Otolaryngology For Closure Text (Leave Blank If You Do Not Want): After obtaining clear surgical margins the patient was sent to otolaryngology for surgical repair. The patient understands they will receive post-surgical care and follow-up from the referring physician's office. Composite Graft Text: The defect edges were debeveled with a #15 scalpel blade. Given the location of the defect, shape of the defect, the proximity to free margins and the fact the defect was full thickness a composite graft was deemed most appropriate. The defect was outline and then transferred to the donor site. A full thickness graft was then excised from the donor site. The graft was then placed in the primary defect, oriented appropriately and then sutured into place. The secondary defect was then repaired using a primary closure. Mucosal Advancement Flap Text: Given the location of the defect, shape of the defect and the proximity to free margins a mucosal advancement flap was deemed most appropriate. Incisions were made with a 15 blade scalpel in the appropriate fashion along the cutaneous vermillion border and the mucosal lip. The remaining actinically damaged mucosal tissue was excised. The mucosal advancement flap was then elevated to the gingival sulcus with care taken to preserve the neurovascular structures and advanced into the primary defect. Care was taken to ensure that precise realignment of the vermillion border was achieved. Secondary Intention Text (Leave Blank If You Do Not Want): The defect will heal with secondary intention. Primary Defect Width In Cm (Final Defect Size - Required For Flaps/Grafts): 1.1 Home Suture Removal Text: Patient was provided instructions on removing sutures and will remove their sutures at home. If they have any questions or difficulties they will call the office. Area H Indication Text: Tumors in this location are included in Area H (eyelids, eyebrows, nose, lips, chin, ear, pre-auricular, post-auricular, temple, genitalia, hands, feet, ankles and areola). Tissue conservation is critical in these anatomic locations. Rhombic Flap Text: The defect edges were debeveled with a #15 scalpel blade. Given the location of the defect and the proximity to free margins a rhombic flap was deemed most appropriate. Using a sterile surgical marker, an appropriate rhombic flap was drawn incorporating the defect. The area thus outlined was incised deep to adipose tissue with a #15 scalpel blade. The skin margins were undermined to an appropriate distance in all directions utilizing iris scissors. Previous Accession (Optional): [id number] Crescentic Complex Repair Preamble Text (Leave Blank If You Do Not Want): Extensive wide undermining was performed. Anesthesia Volume In Cc: 6 Bcc  Nodular Histology Text: There are uniform nodular masses of basaloid neoplastic cells with scanty cytoplasm and peripheral palisading with a loose fibrous stroma. The appearance is typical for a nodular basal cell carcinoma. Alar Island Pedicle Flap Text: The defect edges were debeveled with a #15 scalpel blade. Given the location of the defect, shape of the defect and the proximity to the alar rim an island pedicle advancement flap was deemed most appropriate. Using a sterile surgical marker, an appropriate advancement flap was drawn incorporating the defect, outlining the appropriate donor tissue and placing the expected incisions within the nasal ala running parallel to the alar rim. The area thus outlined was incised with a #15 scalpel blade. The skin margins were undermined minimally to an appropriate distance in all directions around the primary defect and laterally outward around the island pedicle utilizing iris scissors. There was minimal undermining beneath the pedicle flap. Island Pedicle Flap-Requiring Vessel Identification Text: The defect edges were debeveled with a #15 scalpel blade. Given the location of the defect, shape of the defect and the proximity to free margins an island pedicle advancement flap was deemed most appropriate. Using a sterile surgical marker, an appropriate advancement flap was drawn, based on the axial vessel mentioned above, incorporating the defect, outlining the appropriate donor tissue and placing the expected incisions within the relaxed skin tension lines where possible. The area thus outlined was incised deep to adipose tissue with a #15 scalpel blade. The skin margins were undermined to an appropriate distance in all directions around the primary defect and laterally outward around the island pedicle utilizing iris scissors. There was minimal undermining beneath the pedicle flap. Cheiloplasty (Less Than 50%) Text: A decision was made to reconstruct the defect with a  cheiloplasty. The defect was undermined extensively. Additional obicularis oris muscle was excised with a 15 blade scalpel. The defect was converted into a full thickness wedge, of less than 50% of the vertical height of the lip, to facilite a better cosmetic result. Small vessels were then tied off with 5-0 monocyrl. The obicularis oris, superficial fascia, adipose and dermis were then reapproximated. After the deeper layers were approximated the epidermis was reapproximated with particular care given to realign the vermillion border. Detail Level: Detailed Location Indication Override (Is Already Calculated Based On Selected Body Location): Area H O-L Flap Text: The defect edges were debeveled with a #15 scalpel blade. Given the location of the defect, shape of the defect and the proximity to free margins an O-L flap was deemed most appropriate. Using a sterile surgical marker, an appropriate advancement flap was drawn incorporating the defect and placing the expected incisions within the relaxed skin tension lines where possible. The area thus outlined was incised deep to adipose tissue with a #15 scalpel blade. The skin margins were undermined to an appropriate distance in all directions utilizing iris scissors. Stage 1: Number Of Blocks?: 1 Island Pedicle Flap With Canthal Suspension Text: The defect edges were debeveled with a #15 scalpel blade. Given the location of the defect, shape of the defect and the proximity to free margins an island pedicle advancement flap was deemed most appropriate. Using a sterile surgical marker, an appropriate advancement flap was drawn incorporating the defect, outlining the appropriate donor tissue and placing the expected incisions within the relaxed skin tension lines where possible. The area thus outlined was incised deep to adipose tissue with a #15 scalpel blade. The skin margins were undermined to an appropriate distance in all directions around the primary defect and laterally outward around the island pedicle utilizing iris scissors. There was minimal undermining beneath the pedicle flap. A suspension suture was placed in the canthal tendon to prevent tension and prevent ectropion. Epidermal Autograft Text: The defect edges were debeveled with a #15 scalpel blade. Given the location of the defect, shape of the defect and the proximity to free margins an epidermal autograft was deemed most appropriate. Using a sterile surgical marker, the primary defect shape was transferred to the donor site. The epidermal graft was then harvested. The skin graft was then placed in the primary defect and oriented appropriately. H Plasty Text: Given the location of the defect, shape of the defect and the proximity to free margins a H-plasty was deemed most appropriate for repair. Using a sterile surgical marker, the appropriate advancement arms of the H-plasty were drawn incorporating the defect and placing the expected incisions within the relaxed skin tension lines where possible. The area thus outlined was incised deep to adipose tissue with a #15 scalpel blade. The skin margins were undermined to an appropriate distance in all directions utilizing iris scissors. The opposing advancement arms were then advanced into place in opposite direction and anchored with interrupted buried subcutaneous sutures. Surgeon/Pathologist Verbiage (Will Incorporate Name Of Surgeon From Intro If Not Blank): operated in two distinct and integrated capacities as the surgeon and pathologist. Area L Indication Text: Tumors in this location are included in Area L (trunk and extremities). Mohs surgery is indicated for larger tumors, or tumors with aggressive histologic features, in these anatomic locations. Epidermal Closure Graft Donor Site (Optional): simple interrupted Mauc Instructions: By selecting yes to the question below the [facility] number will be added into the note. This will be calculated automatically based on the diagnosis chosen, the size entered, the body zone selected (H,M,L) and the specific indications you chose. You will also have the option to override the Mohs AUC if you disagree with the automatically calculated number and this option is found in the Case Summary tab. Interpolation Flap Text: A decision was made to reconstruct the defect utilizing an interpolation axial flap and a staged reconstruction. A telfa template was made of the defect. This telfa template was then used to outline the interpolation flap. The donor area for the pedicle flap was then injected with anesthesia. The flap was excised through the skin and subcutaneous tissue down to the layer of the underlying musculature. The interpolation flap was carefully excised within this deep plane to maintain its blood supply. The edges of the donor site were undermined. The donor site was closed in a primary fashion. The pedicle was then rotated into position and sutured. Once the tube was sutured into place, adequate blood supply was confirmed with blanching and refill. The pedicle was then wrapped with xeroform gauze and dressed appropriately with a telfa and gauze bandage to ensure continued blood supply and protect the attached pedicle. Mohs Rapid Report Verbiage: The area of clinically evident tumor was marked with skin marking ink and appropriately hatched. The initial incision was made following the Mohs approach through the skin. The specimen was taken to the lab, divided into the necessary number of pieces, chromacoded and processed according to the Mohs protocol. This was repeated in successive stages until a tumor free defect was achieved. Mohs Case Number: [id number] Referring Physician (Optional): Meme Fuentes PA-C Rotation Flap Text: The defect edges were debeveled with a #15 scalpel blade. Given the location of the defect, shape of the defect and the proximity to free margins a rotation flap was deemed most appropriate. Using a sterile surgical marker, an appropriate rotation flap was drawn incorporating the defect and placing the expected incisions within the relaxed skin tension lines where possible. The area thus outlined was incised deep to adipose tissue with a #15 scalpel blade. The skin margins were undermined to an appropriate distance in all directions utilizing iris scissors. Same Histology In Subsequent Stages Text: The pattern and morphology of the tumor is as described in the first stage. Bilobed Transposition Flap Text: The defect edges were debeveled with a #15 scalpel blade. Given the location of the defect and the proximity to free margins a bilobed transposition flap was deemed most appropriate. Using a sterile surgical marker, an appropriate bilobe flap drawn around the defect. The area thus outlined was incised deep to adipose tissue with a #15 scalpel blade. The skin margins were undermined to an appropriate distance in all directions utilizing iris scissors. Scc Ka Subtype Histology Text: there is a cup-shaped exoendophytic epithelial neoplasm characterized by proliferations of keratinocytes with abundant eosinophilic glossy cytoplasm and nuclei with open chromatin in the papillary and upper reticular dermis. Multiple inclusions containing elastic material are seen within the cytoplasm. The features are of squamous cell carcinoma, keratoacanthoma type. Tumor Debulked?: curette Scc Poorly Differentiated Histology Text: there are strands and nests of pleomorphic cells present in the infiltrative pattern. Mitotic activity is brisk. There is vascular proliferation and acute and chronic inflammation in the dermis. The findings are those of a poorly differentiated squamous cell carcinoma. Mohs Method Verbiage: An incision at a 45 degree angle following the standard Mohs approach was done and the specimen was harvested as a microscopic controlled layer. Partial Purse String (Simple) Text: Given the location of the defect and the characteristics of the surrounding skin a simple purse string closure was deemed most appropriate. Undermining was performed circumfirentially around the surgical defect. A purse string suture was then placed and tightened. Wound tension only allowed a partial closure of the circular defect. Localized Dermabrasion Text: The patient was draped in routine manner. Localized dermabrasion using 3 x 17 mm wire brush was performed in routine manner to papillary dermis. This spot dermabrasion is being performed to complete skin cancer reconstruction. It also will eliminate the other sun damaged precancerous cells that are known to be part of the regional effect of a lifetime's worth of sun exposure. This localized dermabrasion is therapeutic and should not be considered cosmetic in any regard. Skin Substitute Text: The defect edges were debeveled with a #15 scalpel blade. Given the location of the defect, shape of the defect and the proximity to free margins a skin substitute graft was deemed most appropriate. The graft material was trimmed to fit the size of the defect. The graft was then placed in the primary defect and oriented appropriately. Posterior Auricular Interpolation Flap Text: A decision was made to reconstruct the defect utilizing an interpolation axial flap and a staged reconstruction. A telfa template was made of the defect. This telfa template was then used to outline the posterior auricular interpolation flap. The donor area for the pedicle flap was then injected with anesthesia. The flap was excised through the skin and subcutaneous tissue down to the layer of the underlying musculature. The pedicle flap was carefully excised within this deep plane to maintain its blood supply. The edges of the donor site were undermined. The donor site was closed in a primary fashion. The pedicle was then rotated into position and sutured. Once the tube was sutured into place, adequate blood supply was confirmed with blanching and refill. The pedicle was then wrapped with xeroform gauze and dressed appropriately with a telfa and gauze bandage to ensure continued blood supply and protect the attached pedicle. Cartilage Graft Text: The defect edges were debeveled with a #15 scalpel blade. Given the location of the defect, shape of the defect, the fact the defect involved a full thickness cartilage defect a cartilage graft was deemed most appropriate. An appropriate donor site was identified, cleansed, and anesthetized. The cartilage graft was then harvested and transferred to the recipient site, oriented appropriately and then sutured into place. The secondary defect was then repaired using a primary closure. Bcc Infiltrative Histology Text: There were numerous aggregates of basaloid cells demonstrating an infiltrative pattern. Z Plasty Text: The lesion was extirpated to the level of the fat with a #15 scalpel blade. Given the location of the defect, shape of the defect and the proximity to free margins a Z-plasty was deemed most appropriate for repair. Using a sterile surgical marker, the appropriate transposition arms of the Z-plasty were drawn incorporating the defect and placing the expected incisions within the relaxed skin tension lines where possible. The area thus outlined was incised deep to adipose tissue with a #15 scalpel blade. The skin margins were undermined to an appropriate distance in all directions utilizing iris scissors. The opposing transposition arms were then transposed into place in opposite direction and anchored with interrupted buried subcutaneous sutures. Bi-Rhombic Flap Text: The defect edges were debeveled with a #15 scalpel blade. Given the location of the defect and the proximity to free margins a bi-rhombic flap was deemed most appropriate. Using a sterile surgical marker, an appropriate rhombic flap was drawn incorporating the defect. The area thus outlined was incised deep to adipose tissue with a #15 scalpel blade. The skin margins were undermined to an appropriate distance in all directions utilizing iris scissors. Mastoid Interpolation Flap Text: A decision was made to reconstruct the defect utilizing an interpolation axial flap and a staged reconstruction. A telfa template was made of the defect. This telfa template was then used to outline the mastoid interpolation flap. The donor area for the pedicle flap was then injected with anesthesia. The flap was excised through the skin and subcutaneous tissue down to the layer of the underlying musculature. The pedicle flap was carefully excised within this deep plane to maintain its blood supply. The edges of the donor site were undermined. The donor site was closed in a primary fashion. The pedicle was then rotated into position and sutured. Once the tube was sutured into place, adequate blood supply was confirmed with blanching and refill. The pedicle was then wrapped with xeroform gauze and dressed appropriately with a telfa and gauze bandage to ensure continued blood supply and protect the attached pedicle. No Repair - Repaired With Adjacent Surgical Defect Text (Leave Blank If You Do Not Want): After obtaining clear surgical margins the defect was repaired concurrently with another surgical defect which was in close approximation. Consent (Temporal Branch)/Introductory Paragraph: The rationale for Mohs was explained to the patient and consent was obtained. The risks, benefits and alternatives to therapy were discussed in detail. Specifically, the risks of damage to the temporal branch of the facial nerve, infection, scarring, bleeding, prolonged wound healing, incomplete removal, allergy to anesthesia, and recurrence were addressed. Prior to the procedure, the treatment site was clearly identified and confirmed by the patient. All components of Universal Protocol/PAUSE Rule completed. Advancement Flap (Double) Text: The defect edges were debeveled with a #15 scalpel blade. Given the location of the defect and the proximity to free margins a double advancement flap was deemed most appropriate. Using a sterile surgical marker, the appropriate advancement flaps were drawn incorporating the defect and placing the expected incisions within the relaxed skin tension lines where possible. The area thus outlined was incised deep to adipose tissue with a #15 scalpel blade. The skin margins were undermined to an appropriate distance in all directions utilizing iris scissors. Graft Donor Site Bandage (Optional-Leave Blank If You Don't Want In Note): Steri-strips and a pressure bandage were applied to the donor site. Modified Advancement Flap Text: The defect edges were debeveled with a #15 scalpel blade. Given the location of the defect, shape of the defect and the proximity to free margins a modified advancement flap was deemed most appropriate. Using a sterile surgical marker, an appropriate advancement flap was drawn incorporating the defect and placing the expected incisions within the relaxed skin tension lines where possible. The area thus outlined was incised deep to adipose tissue with a #15 scalpel blade. The skin margins were undermined to an appropriate distance in all directions utilizing iris scissors. Partial Purse String (Intermediate) Text: Given the location of the defect and the characteristics of the surrounding skin an intermediate purse string closure was deemed most appropriate. Undermining was performed circumfirentially around the surgical defect. A purse string suture was then placed and tightened. Wound tension only allowed a partial closure of the circular defect. Consent 2/Introductory Paragraph: Mohs surgery was explained to the patient and consent was obtained. The risks, benefits and alternatives to therapy were discussed in detail. Specifically, the risks of infection, scarring, bleeding, prolonged wound healing, incomplete removal, allergy to anesthesia, nerve injury and recurrence were addressed. Prior to the procedure, the treatment site was clearly identified and confirmed by the patient. All components of Universal Protocol/PAUSE Rule completed. Ear Star Wedge Flap Text: The defect edges were debeveled with a #15 blade scalpel. Given the location of the defect and the proximity to free margins (helical rim) an ear star wedge flap was deemed most appropriate. Using a sterile surgical marker, the appropriate flap was drawn incorporating the defect and placing the expected incisions between the helical rim and antihelix where possible. The area thus outlined was incised through and through with a #15 scalpel blade. Crescentic Advancement Flap Text: The defect edges were debeveled with a #15 scalpel blade. Given the location of the defect and the proximity to free margins a crescentic advancement flap was deemed most appropriate. Using a sterile surgical marker, the appropriate advancement flap was drawn incorporating the defect and placing the expected incisions within the relaxed skin tension lines where possible. The area thus outlined was incised deep to adipose tissue with a #15 scalpel blade. The skin margins were undermined to an appropriate distance in all directions utilizing iris scissors. Suture Removal: 7 days V-Y Flap Text: The defect edges were debeveled with a #15 scalpel blade. Given the location of the defect, shape of the defect and the proximity to free margins a V-Y flap was deemed most appropriate. Using a sterile surgical marker, an appropriate advancement flap was drawn incorporating the defect and placing the expected incisions within the relaxed skin tension lines where possible. The area thus outlined was incised deep to adipose tissue with a #15 scalpel blade. The skin margins were undermined to an appropriate distance in all directions utilizing iris scissors. Medical Necessity Statement: Based on my medical judgement; after reviewing the pertinent pathology report, physical exam findings and the various treatment options for skin cancer treatment; standard excision and/or destruction technique methods are not clinically sufficient treatment options. To prevent the risk of compromising surgical cure and reconstruction; prompt microscopic examination of the surgical margins is necessary. Based on the given indication(s), maximum conservation of healthy tissue, and high cure rate, Mohs surgery is the most appropriate treatment for this cancer. Presence Of Scar Tissue (For Histology): absent Consent (Lip)/Introductory Paragraph: The rationale for Mohs was explained to the patient and consent was obtained. The risks, benefits and alternatives to therapy were discussed in detail. Specifically, the risks of lip deformity, changes in the oral aperture, infection, scarring, bleeding, prolonged wound healing, incomplete removal, allergy to anesthesia, nerve injury and recurrence were addressed. Prior to the procedure, the treatment site was clearly identified and confirmed by the patient. All components of Universal Protocol/PAUSE Rule completed. Split-Thickness Skin Graft Text: The defect edges were debeveled with a #15 scalpel blade. Given the location of the defect, shape of the defect and the proximity to free margins a split thickness skin graft was deemed most appropriate. Using a sterile surgical marker, the primary defect shape was transferred to the donor site. The split thickness graft was then harvested. The skin graft was then placed in the primary defect and oriented appropriately. O-Z Plasty Text: The defect edges were debeveled with a #15 scalpel blade. Given the location of the defect, shape of the defect and the proximity to free margins an O-Z plasty (double transposition flap) was deemed most appropriate. Using a sterile surgical marker, the appropriate transposition flaps were drawn incorporating the defect and placing the expected incisions within the relaxed skin tension lines where possible. The area thus outlined was incised deep to adipose tissue with a #15 scalpel blade. The skin margins were undermined to an appropriate distance in all directions utilizing iris scissors. Hemostasis was achieved with electrocautery. The flaps were then transposed into place, one clockwise and the other counterclockwise, and anchored with interrupted buried subcutaneous sutures. Purse String (Simple) Text: Given the location of the defect and the characteristics of the surrounding skin a pursestring closure was deemed most appropriate. Undermining was performed circumfirentially around the surgical defect. A purstring suture was then placed and tightened. Postop Diagnosis: same Tissue Cultured Epidermal Autograft Text: The defect edges were debeveled with a #15 scalpel blade. Given the location of the defect, shape of the defect and the proximity to free margins a tissue cultured epidermal autograft was deemed most appropriate. The graft was then trimmed to fit the size of the defect. The graft was then placed in the primary defect and oriented appropriately. Post-Care Instructions: It was determined that the best option for surgical repair of the Mohs defect was to refer the patient to plastics. The patient was referred to Plastics for closure. See repair operative not for details of repair performed. I reviewed with the patient in detail post-care instructions. Patient is not to engage in any heavy lifting, exercise, or swimming for the next 14 days. Should the patient develop any fevers, chills, bleeding, severe pain patient will contact the office immediately. Unna Boot Text: An Unna boot was placed to help immobilize the limb and facilitate more rapid healing. Cheiloplasty (Complex) Text: A decision was made to reconstruct the defect with a  cheiloplasty. The defect was undermined extensively. Additional obicularis oris muscle was excised with a 15 blade scalpel. The defect was converted into a full thickness wedge to facilite a better cosmetic result. Small vessels were then tied off with 5-0 monocyrl. The obicularis oris, superficial fascia, adipose and dermis were then reapproximated. After the deeper layers were approximated the epidermis was reapproximated with particular care given to realign the vermillion border. Bcc Superficial Histology Text: Beneath an irregular epidermis, there are small nodular masses of basaloid neoplastic cells with nuclear pleomorphism attached to the basal layer and surrounded by a loose fibrous stroma and mild inflammation in the superficial dermis. The findings are typical for a superficial type of basal cell carcinoma. Melolabial Transposition Flap Text: The defect edges were debeveled with a #15 scalpel blade. Given the location of the defect and the proximity to free margins a melolabial flap was deemed most appropriate. Using a sterile surgical marker, an appropriate melolabial transposition flap was drawn incorporating the defect. The area thus outlined was incised deep to adipose tissue with a #15 scalpel blade. The skin margins were undermined to an appropriate distance in all directions utilizing iris scissors. Referred To Plastics For Closure Text (Leave Blank If You Do Not Want): After obtaining clear surgical margins the patient was sent to plastics for surgical repair. The patient understands they will receive post-surgical care and follow-up from the referring physician's office. Surgeon: Toni Hawley DO Referred To Mid-Level For Closure Text (Leave Blank If You Do Not Want): After obtaining clear surgical margins the patient was sent to a mid-level provider for surgical repair. The patient understands they will receive post-surgical care and follow-up from the mid-level provider. V-Y Plasty Text: The defect edges were debeveled with a #15 scalpel blade. Given the location of the defect, shape of the defect and the proximity to free margins an V-Y advancement flap was deemed most appropriate. Using a sterile surgical marker, an appropriate advancement flap was drawn incorporating the defect and placing the expected incisions within the relaxed skin tension lines where possible. The area thus outlined was incised deep to adipose tissue with a #15 scalpel blade. The skin margins were undermined to an appropriate distance in all directions utilizing iris scissors. Full Thickness Lip Wedge Repair (Flap) Text: Given the location of the defect and the proximity to free margins a full thickness wedge repair was deemed most appropriate. Using a sterile surgical marker, the appropriate repair was drawn incorporating the defect and placing the expected incisions perpendicular to the vermillion border. The vermillion border was also meticulously outlined to ensure appropriate reapproximation during the repair. The area thus outlined was incised through and through with a #15 scalpel blade. The muscularis and dermis were reaproximated with deep sutures following hemostasis. Care was taken to realign the vermillion border before proceeding with the superficial closure. Once the vermillion was realigned the superfical and mucosal closure was finished. Subsequent Stages Histo Method Verbiage: Using a similar technique to that described above, a thin layer of tissue was removed from all areas where tumor was visible on the previous stage. The tissue was again oriented, mapped, dyed, and processed as above. Deep Sutures: 5-0 Vicryl Wound Care (No Sutures): Petrolatum Cheek-To-Nose Interpolation Flap Text: A decision was made to reconstruct the defect utilizing an interpolation axial flap and a staged reconstruction. A telfa template was made of the defect. This telfa template was then used to outline the Cheek-To-Nose Interpolation flap. The donor area for the pedicle flap was then injected with anesthesia. The flap was excised through the skin and subcutaneous tissue down to the layer of the underlying musculature. The interpolation flap was carefully excised within this deep plane to maintain its blood supply. The edges of the donor site were undermined. The donor site was closed in a primary fashion. The pedicle was then rotated into position and sutured. Once the tube was sutured into place, adequate blood supply was confirmed with blanching and refill. The pedicle was then wrapped with xeroform gauze and dressed appropriately with a telfa and gauze bandage to ensure continued blood supply and protect the attached pedicle. Star Wedge Flap Text: The defect edges were debeveled with a #15 scalpel blade. Given the location of the defect, shape of the defect and the proximity to free margins a star wedge flap was deemed most appropriate. Using a sterile surgical marker, an appropriate rotation flap was drawn incorporating the defect and placing the expected incisions within the relaxed skin tension lines where possible. The area thus outlined was incised deep to adipose tissue with a #15 scalpel blade. The skin margins were undermined to an appropriate distance in all directions utilizing iris scissors. Scc Histology Text: There is hyperkeratosis, acanthosis, and cytologic atypia of keratinocytes with hyperchromatic and pleomorphic nuclei throughout the epidermis. No dermal invasion is seen. Chronic inflammation is present in the dermis. Consent (Near Eyelid Margin)/Introductory Paragraph: The rationale for Mohs was explained to the patient and consent was obtained. The risks, benefits and alternatives to therapy were discussed in detail. Specifically, the risks of ectropion or eyelid deformity, infection, scarring, bleeding, prolonged wound healing, incomplete removal, allergy to anesthesia, nerve injury and recurrence were addressed. Prior to the procedure, the treatment site was clearly identified and confirmed by the patient. All components of Universal Protocol/PAUSE Rule completed. Epidermal Sutures: 6-0 Ethilon Complex Repair And Flap Additional Text (Will Appearing After The Standard Complex Repair Text): The complex repair was not sufficient to completely close the primary defect. The remaining additional defect was repaired with the flap mentioned below. Keystone Flap Text: The defect edges were debeveled with a #15 scalpel blade. Given the location of the defect, shape of the defect a keystone flap was deemed most appropriate. Using a sterile surgical marker, an appropriate keystone flap was drawn incorporating the defect, outlining the appropriate donor tissue and placing the expected incisions within the relaxed skin tension lines where possible. The area thus outlined was incised deep to adipose tissue with a #15 scalpel blade. The skin margins were undermined to an appropriate distance in all directions around the primary defect and laterally outward around the flap utilizing iris scissors. O-T Advancement Flap Text: The defect edges were debeveled with a #15 scalpel blade. Given the location of the defect, shape of the defect and the proximity to free margins an O-T advancement flap was deemed most appropriate. Using a sterile surgical marker, an appropriate advancement flap was drawn incorporating the defect and placing the expected incisions within the relaxed skin tension lines where possible. The area thus outlined was incised deep to adipose tissue with a #15 scalpel blade. The skin margins were undermined to an appropriate distance in all directions utilizing iris scissors. Anesthesia Volume In Cc: 3 Referred To Oculoplastics For Closure Text (Leave Blank If You Do Not Want): After obtaining clear surgical margins the patient was sent to oculoplastics for surgical repair. The patient understands they will receive post-surgical care and follow-up from the referring physician's office. Tarsorrhaphy Text: A tarsorrhaphy was performed using Frost sutures. Depth Of Tumor Invasion (For Histology): tumor not visualized (deep and peripheral margins are clear of tumor) Wound Care: Bacitracin Body Location Override (Optional - Billing Will Still Be Based On Selected Body Map Location If Applicable): right lateral temple Spiral Flap Text: The defect edges were debeveled with a #15 scalpel blade. Given the location of the defect, shape of the defect and the proximity to free margins a spiral flap was deemed most appropriate. Using a sterile surgical marker, an appropriate rotation flap was drawn incorporating the defect and placing the expected incisions within the relaxed skin tension lines where possible. The area thus outlined was incised deep to adipose tissue with a #15 scalpel blade. The skin margins were undermined to an appropriate distance in all directions utilizing iris scissors. Date Of Previous Biopsy (Optional): 11/30/2017 Helical Rim Advancement Flap Text: The defect edges were debeveled with a #15 blade scalpel. Given the location of the defect and the proximity to free margins (helical rim) a double helical rim advancement flap was deemed most appropriate. Using a sterile surgical marker, the appropriate advancement flaps were drawn incorporating the defect and placing the expected incisions between the helical rim and antihelix where possible. The area thus outlined was incised through and through with a #15 scalpel blade. With a skin hook and iris scissors, the flaps were gently and sharply undermined and freed up. Paramedian Forehead Flap Text: A decision was made to reconstruct the defect utilizing an interpolation axial flap and a staged reconstruction. A telfa template was made of the defect. This telfa template was then used to outline the paramedian forehead pedicle flap. The donor area for the pedicle flap was then injected with anesthesia. The flap was excised through the skin and subcutaneous tissue down to the layer of the underlying musculature. The pedicle flap was carefully excised within this deep plane to maintain its blood supply. The edges of the donor site were undermined. The donor site was closed in a primary fashion. The pedicle was then rotated into position and sutured. Once the tube was sutured into place, adequate blood supply was confirmed with blanching and refill. The pedicle was then wrapped with xeroform gauze and dressed appropriately with a telfa and gauze bandage to ensure continued blood supply and protect the attached pedicle. Dermal Autograft Text: The defect edges were debeveled with a #15 scalpel blade. Given the location of the defect, shape of the defect and the proximity to free margins a dermal autograft was deemed most appropriate. Using a sterile surgical marker, the primary defect shape was transferred to the donor site. The area thus outlined was incised deep to adipose tissue with a #15 scalpel blade. The harvested graft was then trimmed of adipose and epidermal tissue until only dermis was left. The skin graft was then placed in the primary defect and oriented appropriately. No Residual Tumor Seen Histology Text: There were no malignant cells seen in the sections examined. Hatchet Flap Text: The defect edges were debeveled with a #15 scalpel blade. Given the location of the defect, shape of the defect and the proximity to free margins a hatchet flap was deemed most appropriate. Using a sterile surgical marker, an appropriate hatchet flap was drawn incorporating the defect and placing the expected incisions within the relaxed skin tension lines where possible. The area thus outlined was incised deep to adipose tissue with a #15 scalpel blade. The skin margins were undermined to an appropriate distance in all directions utilizing iris scissors. Estimated Blood Loss (Cc): minimal Bcc Histology Text: Beneath an irregular epidermis, there are small nodular masses of basaloid neoplastic cells with nuclear pleomorphism attached to the basal layer and surrounded by a loose fibrous stroma and mild inflammation in the dermis. The findings are typical for a basal cell carcinoma. Repair Type: Referred to plastics for closure Melolabial Interpolation Flap Text: A decision was made to reconstruct the defect utilizing an interpolation axial flap and a staged reconstruction. A telfa template was made of the defect. This telfa template was then used to outline the melolabial interpolation flap. The donor area for the pedicle flap was then injected with anesthesia. The flap was excised through the skin and subcutaneous tissue down to the layer of the underlying musculature. The pedicle flap was carefully excised within this deep plane to maintain its blood supply. The edges of the donor site were undermined. The donor site was closed in a primary fashion. The pedicle was then rotated into position and sutured. Once the tube was sutured into place, adequate blood supply was confirmed with blanching and refill. The pedicle was then wrapped with xeroform gauze and dressed appropriately with a telfa and gauze bandage to ensure continued blood supply and protect the attached pedicle. Double Island Pedicle Flap Text: The defect edges were debeveled with a #15 scalpel blade. Given the location of the defect, shape of the defect and the proximity to free margins a double island pedicle advancement flap was deemed most appropriate. Using a sterile surgical marker, an appropriate advancement flap was drawn incorporating the defect, outlining the appropriate donor tissue and placing the expected incisions within the relaxed skin tension lines where possible. The area thus outlined was incised deep to adipose tissue with a #15 scalpel blade. The skin margins were undermined to an appropriate distance in all directions around the primary defect and laterally outward around the island pedicle utilizing iris scissors. There was minimal undermining beneath the pedicle flap. Purse String (Intermediate) Text: Given the location of the defect and the characteristics of the surrounding skin a pursestring intermediate closure was deemed most appropriate. Undermining was performed circumfirentially around the surgical defect. A purstring suture was then placed and tightened. Area M Indication Text: Tumors in this location are included in Area M (cheek, forehead, scalp, neck, jawline and pretibial skin). Mohs surgery is indicated for tumors in these anatomic locations. Burow's Advancement Flap Text: The defect edges were debeveled with a #15 scalpel blade. Given the location of the defect and the proximity to free margins a Burow's advancement flap was deemed most appropriate. Using a sterile surgical marker, the appropriate advancement flap was drawn incorporating the defect and placing the expected incisions within the relaxed skin tension lines where possible. The area thus outlined was incised deep to adipose tissue with a #15 scalpel blade. The skin margins were undermined to an appropriate distance in all directions utilizing iris scissors. Consent 1/Introductory Paragraph: Various treatment options for skin cancer treatment; including but not limited to no treatment, cryosurgery or cryotherapy, excision, radiation therapy, electrodessication and curettage, topical therapeutic agents and light therapy were discussed in depth with the patient. The rationale for Mohs was explained to the patient and consent was obtained. The risks, benefits and alternatives to therapy were discussed in detail. Specifically, the risks of infection, scarring, bleeding, prolonged wound healing, incomplete removal, allergy to anesthesia, nerve injury and recurrence were addressed. Prior to the procedure, the treatment site was clearly identified and confirmed by the patient and the patient agreed that Mohs surgery is the best treatment option for their lesion. No guarantees were made or implied; close follow-up was stressed out to the patient. All components of Universal Protocol/PAUSE Rule completed. Consent (Marginal Mandibular)/Introductory Paragraph: The rationale for Mohs was explained to the patient and consent was obtained. The risks, benefits and alternatives to therapy were discussed in detail. Specifically, the risks of damage to the marginal mandibular branch of the facial nerve, infection, scarring, bleeding, prolonged wound healing, incomplete removal, allergy to anesthesia, and recurrence were addressed. Prior to the procedure, the treatment site was clearly identified and confirmed by the patient. All components of Universal Protocol/PAUSE Rule completed. Consent Type: Consent 1 (Standard) Inflammation Suggestive Of Cancer Camouflage Histology Text: There was a dense lymphocytic infiltrate which prevented adequate histologic evaluation of adjacent structures. Bcc  Morpheaform/Sclerosing Histology Text: irregular nests of pleomorphic, hyperchromatic basaloid cells with peripheral palisading infiltrate the tissue in a diffuse fashion in association with a mucoid stroma and dense dermal sclerosis. The tumor infiltrates in thin strands and single cells among the sclerotic collagen fibers in a pattern of morpheaform variant of basal cell carcinoma. Mohs Histo Method Verbiage: Each section was then chromacoded and processed in the Mohs lab using the Mohs protocol and submitted for frozen section. Eye Protection Verbiage: Before proceeding with the stage, a plastic scleral shield was inserted. The globe was anesthetized with a few drops of 1% lidocaine with 1:100,000 epinephrine. Then, an appropriate sized scleral shield was chosen and coated with lacrilube ointment. The shield was gently inserted and left in place for the duration of each stage. After the stage was completed, the shield was gently removed. S Plasty Text: Given the location and shape of the defect, and the orientation of relaxed skin tension lines, an S-plasty was deemed most appropriate for repair. Using a sterile surgical marker, the appropriate outline of the S-plasty was drawn, incorporating the defect and placing the expected incisions within the relaxed skin tension lines where possible. The area thus outlined was incised deep to adipose tissue with a #15 scalpel blade. The skin margins were undermined to an appropriate distance in all directions utilizing iris scissors. The skin flaps were advanced over the defect. The opposing margins were then approximated with interrupted buried subcutaneous sutures. Closure 4 Information: This tab is for additional flaps and grafts above and beyond our usual structured repairs.  Please note if you enter information here it will not currently bill and you will need to add the billing information manually. Muscle Hinge Flap Text: The defect edges were debeveled with a #15 scalpel blade. Given the size, depth and location of the defect and the proximity to free margins a muscle hinge flap was deemed most appropriate. Using a sterile surgical marker, an appropriate hinge flap was drawn incorporating the defect. The area thus outlined was incised with a #15 scalpel blade. The skin margins were undermined to an appropriate distance in all directions utilizing iris scissors. Advancement Flap (Single) Text: The defect edges were debeveled with a #15 scalpel blade. Given the location of the defect and the proximity to free margins a single advancement flap was deemed most appropriate. Using a sterile surgical marker, an appropriate advancement flap was drawn incorporating the defect and placing the expected incisions within the relaxed skin tension lines where possible. The area thus outlined was incised deep to adipose tissue with a #15 scalpel blade. The skin margins were undermined to an appropriate distance in all directions utilizing iris scissors. Mixed Nodular And Infiltrative Bcc Histology Text: Irregular nests of pleomorphic, hyperchromatic basaloid cells with peripheral palisading infiltrate the tissue in a diffuse fashion in association with sclerotic stroma. Island Pedicle Flap Text: The defect edges were debeveled with a #15 scalpel blade. Given the location of the defect, shape of the defect and the proximity to free margins an island pedicle advancement flap was deemed most appropriate. Using a sterile surgical marker, an appropriate advancement flap was drawn incorporating the defect, outlining the appropriate donor tissue and placing the expected incisions within the relaxed skin tension lines where possible. The area thus outlined was incised deep to adipose tissue with a #15 scalpel blade. The skin margins were undermined to an appropriate distance in all directions around the primary defect and laterally outward around the island pedicle utilizing iris scissors. There was minimal undermining beneath the pedicle flap. Consent 3/Introductory Paragraph: I gave the patient a chance to ask questions they had about the procedure. Following this I explained the Mohs procedure and consent was obtained. The risks, benefits and alternatives to therapy were discussed in detail. Specifically, the risks of infection, scarring, bleeding, prolonged wound healing, incomplete removal, allergy to anesthesia, nerve injury and recurrence were addressed. Prior to the procedure, the treatment site was clearly identified and confirmed by the patient. All components of Universal Protocol/PAUSE Rule completed. W Plasty Text: The lesion was extirpated to the level of the fat with a #15 scalpel blade. Given the location of the defect, shape of the defect and the proximity to free margins a W-plasty was deemed most appropriate for repair. Using a sterile surgical marker, the appropriate transposition arms of the W-plasty were drawn incorporating the defect and placing the expected incisions within the relaxed skin tension lines where possible. The area thus outlined was incised deep to adipose tissue with a #15 scalpel blade. The skin margins were undermined to an appropriate distance in all directions utilizing iris scissors. The opposing transposition arms were then transposed into place in opposite direction and anchored with interrupted buried subcutaneous sutures. Trilobed Flap Text: The defect edges were debeveled with a #15 scalpel blade. Given the location of the defect and the proximity to free margins a trilobed flap was deemed most appropriate. Using a sterile surgical marker, an appropriate trilobed flap drawn around the defect. The area thus outlined was incised deep to adipose tissue with a #15 scalpel blade. The skin margins were undermined to an appropriate distance in all directions utilizing iris scissors.

## 2018-08-23 ENCOUNTER — HOSPITAL ENCOUNTER (INPATIENT)
Dept: HOSPITAL 42 - ED | Age: 78
LOS: 2 days | Discharge: HOME | DRG: 390 | End: 2018-08-25
Attending: FAMILY MEDICINE | Admitting: FAMILY MEDICINE
Payer: MEDICARE

## 2018-08-23 VITALS — BODY MASS INDEX: 21.6 KG/M2

## 2018-08-23 DIAGNOSIS — Z86.73: ICD-10-CM

## 2018-08-23 DIAGNOSIS — Z86.010: ICD-10-CM

## 2018-08-23 DIAGNOSIS — Z90.49: ICD-10-CM

## 2018-08-23 DIAGNOSIS — I10: ICD-10-CM

## 2018-08-23 DIAGNOSIS — E11.9: ICD-10-CM

## 2018-08-23 DIAGNOSIS — K56.51: Primary | ICD-10-CM

## 2018-08-23 LAB
ALBUMIN SERPL-MCNC: 4.4 G/DL (ref 3–4.8)
ALBUMIN/GLOB SERPL: 1.3 {RATIO} (ref 1.1–1.8)
ALT SERPL-CCNC: 31 U/L (ref 7–56)
APPEARANCE UR: CLEAR
APTT BLD: 34 SECONDS (ref 25.1–36.5)
AST SERPL-CCNC: 37 U/L (ref 17–59)
BACTERIA #/AREA URNS HPF: (no result) /[HPF]
BASOPHILS # BLD AUTO: 0.02 K/MM3 (ref 0–2)
BASOPHILS NFR BLD: 0.2 % (ref 0–3)
BILIRUB UR-MCNC: NEGATIVE MG/DL
BUN SERPL-MCNC: 29 MG/DL (ref 7–21)
CALCIUM SERPL-MCNC: 9.8 MG/DL (ref 8.4–10.5)
COLOR UR: YELLOW
EOSINOPHIL # BLD: 0.1 10*3/UL (ref 0–0.7)
EOSINOPHIL NFR BLD: 1 % (ref 1.5–5)
EPI CELLS #/AREA URNS HPF: (no result) /HPF (ref 0–5)
ERYTHROCYTE [DISTWIDTH] IN BLOOD BY AUTOMATED COUNT: 13.7 % (ref 11.5–14.5)
GFR NON-AFRICAN AMERICAN: 49
GLUCOSE UR STRIP-MCNC: NEGATIVE MG/DL
GRANULOCYTES # BLD: 10.7 10*3/UL (ref 1.4–6.5)
GRANULOCYTES NFR BLD: 84.1 % (ref 50–68)
HGB BLD-MCNC: 14.8 G/DL (ref 14–18)
INR PPP: 0.92
LEUKOCYTE ESTERASE UR-ACNC: NEGATIVE LEU/UL
LIPASE SERPL-CCNC: 203 U/L (ref 23–300)
LYMPHOCYTES # BLD: 1.4 10*3/UL (ref 1.2–3.4)
LYMPHOCYTES NFR BLD AUTO: 10.9 % (ref 22–35)
MCH RBC QN AUTO: 30.8 PG (ref 25–35)
MCHC RBC AUTO-ENTMCNC: 34.1 G/DL (ref 31–37)
MCV RBC AUTO: 90.4 FL (ref 80–105)
MONOCYTES # BLD AUTO: 0.5 10*3/UL (ref 0.1–0.6)
MONOCYTES NFR BLD: 3.8 % (ref 1–6)
PH UR STRIP: 8 [PH] (ref 4.7–8)
PLATELET # BLD: 210 10^3/UL (ref 120–450)
PMV BLD AUTO: 10.4 FL (ref 7–11)
PROT UR STRIP-MCNC: 30 MG/DL
PROTHROMBIN TIME: 10.6 SECONDS (ref 9.4–12.5)
RBC # BLD AUTO: 4.8 10^6/UL (ref 3.5–6.1)
RBC # UR STRIP: (no result) /UL
SP GR UR STRIP: <= 1.005 (ref 1–1.03)
UROBILINOGEN UR STRIP-ACNC: 0.2 E.U./DL
WBC # BLD AUTO: 12.7 10^3/UL (ref 4.5–11)
WBC #/AREA URNS HPF: (no result) /HPF (ref 0–6)

## 2018-08-23 NOTE — CT
Date of service: 



08/23/2018



PROCEDURE:  CT Abdomen and Pelvis with contrast



HISTORY:

pain, obstruction



COMPARISON:

07/07/2017 CT



TECHNIQUE:

Contrast dose: 150 cc of Omni 350



Radiation dose:



Total exam DLP = 275 mGy-cm.



This CT exam was performed using one or more of the following dose 

reduction techniques: Automated exposure control, adjustment of the 

mA and/or kV according to patient size, and/or use of iterative 

reconstruction technique.



FINDINGS:



LOWER THORAX:

Unremarkable. 



LIVER:

Unremarkable. No gross lesion or ductal dilatation. 



GALLBLADDER AND BILE DUCTS:

Unremarkable. 



PANCREAS:

There is dilatation of the pancreatic duct which measures 5 mm. There 

is no obstructing mass or stone. The common duct measures 12 mm in 

diameter. The finding is unchanged 



SPLEEN:

Unremarkable. 



ADRENALS:

Unremarkable. No mass. 



KIDNEYS AND URETERS:

There are some enlarged enhancing vessels around the left renal 

pelvis and proximal ureter. The significance of this is uncertain. 

The finding is unchanged 



VASCULATURE:

Unremarkable. No aortic aneurysm. 



BOWEL:

Multiple fluid-filled dilated loops of small bowel are seen 

throughout the abdomen. The transition point is seen in the right 

lower quadrant.  Findings are consistent with partial small bowel 

obstruction probably due to adhesions. The transition point is best 

seen on sagittal image 42.



APPENDIX:

Normal appendix. 



PERITONEUM:

Unremarkable. No free fluid. No free air. 



LYMPH NODES:

Unremarkable. No enlarged lymph nodes. 



BLADDER:

Unremarkable. 



REPRODUCTIVE:

Unremarkable. 



BONES:

No acute fracture. 



OTHER FINDINGS:

None.



IMPRESSION:

Multiple fluid-filled dilated loops of small bowel are seen 

throughout the abdomen. The transition point is seen in the right 

lower quadrant.  Findings are consistent with partial small bowel 

obstruction probably due to adhesions.

## 2018-08-23 NOTE — ED PDOC
Arrival/HPI





- General


Time Seen by Provider: 08/23/18 08:30


Historian: Patient, Spouse (wife)





- History of Present Illness


Narrative History of Present Illness (Text): 


08/23/18 09:03


A 78 year old male, whose past medical history includes diabetes type 2, 

appendectomy, cholelithiasis (cholecystectomy), and sciatica, presents to the 

emergency department complaining of abdominal pain after having bowel movement 

last night. Patient reports he has not had a bowel movement for 3 days prior to 

last night. States he has a history of intestinal blockage (approximately 4 

times in the past). Patient notes pain is not similar to when he had blockage 

in the past. Patient denies any fever, nausea vomiting, diarrhea, or any other 

complaints at this time. Also, patient mentions having endoscopy and 

colonoscopy with removal of polyps performed last year 07/2017.





PMD: Dr. Mendoza





Time/Duration: Other (abdominal pain since last night after having bowel 

movement (prior did not have bowel movement for 3 days))


Symptom Onset: Sudden


Symptom Course: Unchanged





Past Medical History





- Provider Review


Nursing Documentation Reviewed: Yes





- Infectious Disease


Hx of Infectious Diseases: None





- Tetanus Immunization


Tetanus Immunization: Unknown





- Cardiac


Hx Pacemaker: No





- Pulmonary


Hx Respiratory Disorders: No





- Neurological


Hx Paralysis: No





- HEENT


Hx HEENT Disorder: Yes (WEARS RX GLASSES FOR READING)


Hx Cataracts: Yes (BILATERAL SURGERY)





- Renal


Hx Renal Disorder: No





- Endocrine/Metabolic


Hx Diabetes Mellitus Type 2: Yes





- Hematological/Oncological


Hx Blood Transfusions: No


Hx Blood Transfusion Reaction: No





- Integumentary


Hx Dermatological Disorder: No





- Musculoskeletal/Rheumatological


Hx Musculoskeletal Disorders: Yes (SCIATICA)





- Gastrointestinal


Hx Gastrointestinal Disorders: Yes (APPENDECTOMY(RUPTURED APPENDIX))


Hx Gall Bladder Disease: Yes (CHOLELITHIASIS)


Other/Comment: Bowel obstruction





- Genitourinary/Gynecological


Hx Genitourinary Disorders: Yes


Hx Prostate Problems: Yes





- Psychiatric


Hx Emotional Abuse: No


Hx Physical Abuse: No


Hx Substance Use: No





- Past Surgical History


Past Surgical History: No Previous





- Surgical History


Hx Appendectomy: Yes


Hx Cholecystectomy: Yes





- Anesthesia


Hx Anesthesia Reactions: No


Hx Malignant Hyperthermia: No





- Suicidal Assessment


Feels Threatened In Home Enviroment: No





Family/Social History





- Physician Review


Nursing Documentation Reviewed: Yes


Family/Social History: No Known Family HX


Smoking Status: Never Smoked


Hx Alcohol Use: Yes (RED WINE AFTER DINNER)


Hx Substance Use: No


Hx Substance Use Treatment: No





Allergies/Home Meds


Allergies/Adverse Reactions: 


Allergies





No Known Allergies Allergy (Verified 07/06/17 23:43)


 








Home Medications: 


 Home Meds











 Medication  Instructions  Recorded  Confirmed


 


Valsartan [Diovan] 40 mg PO QPM 11/30/16 08/23/18


 


metFORMIN [glucOPHAGE] 500 mg PO PRN PRN 11/30/16 08/23/18


 


Aspirin [Ecotrin] 81 mg PO QPM 03/23/17 08/23/18


 


Omega-3-Acid Ethyl Esters [OMEGA 3] 500 mg PO QAM 06/22/17 08/23/18


 


Ubidecarenone [Co Q-10] 10 mg PO QAM 06/22/17 08/23/18


 


Polyethylene Glycol 3350 [Miralax] 17 gm PO QPM 07/03/17 08/23/18


 


Losartan [Cozaar] 1 tab PO DAILY 07/13/17 08/23/18














Review of Systems





- Physician Review


All systems were reviewed & negative as marked: Yes





- Review of Systems


Constitutional: absent: Fevers


Gastrointestinal: Abdominal Pain (s/p bowel movement).  absent: Diarrhea, Nausea

, Vomiting





Physical Exam





- Physical Exam


Narrative Physical Exam (Text): 





Gen: VS reviewed, alert, well developed, well nourished, nontoxic, mild 

distress.


ENT: normal pharynx.


Eye: EOMI, PERRL.


Neck: no JVD, supple, no adenopathy.


CV: regular rate, regular rhythm, no rubs, no murmur, no gallops, S1, S2, 

pulses equal and strong.


Pulm: no distress, clear to auscultation, no wheeze, no rhonchi, breath sounds 

equal, no rales.


Abd: moderate/mild abdominal tenderness, with guarding, no rebound, no rigidity

, normal bowel sounds.


Ext: no edema.


Skin: good color, no rash, no cyanosis.


Psych: responds appropriately to questions, normal affect.


Neuro: oriented x 3, CN2-12 intact grossly, motor intact, sensation intact.





Vital Signs Reviewed: Yes


Vital Signs











  Temp Pulse Resp BP Pulse Ox


 


 08/23/18 11:31   65  18  140/76  99


 


 08/23/18 09:25   65  18  140/75  98


 


 08/23/18 08:42  98.2 F  68  18  152/76 H  98


 


 08/23/18 08:28  98.5 F  65  16  152/76 H  99











Temperature: Afebrile


Blood Pressure: Normal


Pulse: Regular


Respiratory Rate: Normal


Appearance: Positive for: Well-Appearing


Pain Distress: None


Mental Status: Positive for: Alert and Oriented X 3





Medical Decision Making


ED Course and Treatment: 


08/23/18 09:05


Impression: 78 year old male with abdominal pain. Physical exam shows moderate/

mild abdominal tenderness with guarding, no rebound/rigidity; no other acute 

findings on examination.





Differential Diagnosis included but are not limited to:  Bowel Obstruction vs. 

Diverticulitis.





Plan:


-- EKG


-- Abdominal X-ray


-- Labs


-- Morphine


-- Urinalysis


-- Reassess and disposition





Progress Notes:


08/23/2018 10:40


Abdominal X-ray


IMPRESSION: No active disease.


Dictator: Thomas Orellana MD





08/23/2018 11:26


Abd/Pelvis CT


IMPRESSION:


Multiple fluid-filled dilated loops of small bowel are seen throughout the 

abdomen. The transition point is seen in the right lower quadrant.  Findings 

are consistent with partial small bowel obstruction probably due to adhesions.


Dictator: Thomas Orellana MD





08/23/18 12:07


Case discussed with surgical resident, who come down to the ER to evaluate 

patient at bedside.





08/23/18 13:11


admit accpeted by dr. mendoza


08/23/18 13:15


patient seen by surgical team and have discussed the case with dr. cunningham, 

they do not feel patient requires NGT for now as the patient not currently in 

pain and not vomiting. they will observe clinically for now.





- Lab Interpretations


Lab Results: 








 08/23/18 09:09 





 08/23/18 09:09 





 Lab Results





08/23/18 12:00: Urine Color Yellow, Urine Appearance Clear, Urine pH 8.0, Ur 

Specific Gravity <= 1.005, Urine Protein 30 H, Urine Glucose (UA) Negative, 

Urine Ketones Trace H, Urine Blood Trace-lysed H, Urine Nitrate Negative, Urine 

Bilirubin Negative, Urine Urobilinogen 0.2, Ur Leukocyte Esterase Negative, 

Urine RBC 2 - 5, Urine WBC 0 - 2, Ur Epithelial Cells None, Urine Bacteria Few


08/23/18 09:09: PT 10.6, INR 0.92, APTT 34.0


08/23/18 09:09: Sodium 139, Potassium 4.7, Chloride 103, Carbon Dioxide 24, 

Anion Gap 17, BUN 29 H, Creatinine 1.4, Est GFR (African Amer) 59, Est GFR (Non-

Af Amer) 49, Random Glucose 120 H, Calcium 9.8, Total Bilirubin 0.7, AST 37, 

ALT 31, Alkaline Phosphatase 42, Total Protein 7.8, Albumin 4.4, Globulin 3.4, 

Albumin/Globulin Ratio 1.3, Lipase 203


08/23/18 09:09: WBC 12.7 H D, RBC 4.80, Hgb 14.8, Hct 43.4, MCV 90.4, MCH 30.8, 

MCHC 34.1, RDW 13.7, Plt Count 210, MPV 10.4, Gran % 84.1 H, Lymph % (Auto) 

10.9 L, Mono % (Auto) 3.8, Eos % (Auto) 1.0 L, Baso % (Auto) 0.2, Gran # 10.70 H

, Lymph # (Auto) 1.4, Mono # (Auto) 0.5, Eos # (Auto) 0.1, Baso # (Auto) 0.02


08/23/18 09:00: Blood Type A POSITIVE, Antibody Screen Negative, BBK History 

Checked Patient has bt








I have reviewed the lab results: Yes





- RAD Interpretation


Radiology Orders: 








08/23/18 09:04


ABD 2 VIEWS (FLAT/UP OR DECUB) [RAD] Stat 





08/23/18 10:33


ABDOMEN & PELVIS [ABD & PELVIS IV CONTRAST ONLY] [CT] Stat 














- EKG Interpretation


EKG Interpretation (Text): 





08/23/18 09:48


0842: sinus rhythm at 61 bpm, nml qrs, nml axis, no acute sttw abn


Interpreted by ED Physician: Yes





- Medication Orders


Current Medication Orders: 








Sodium Chloride (Sodium Chloride 0.9%)  1,000 mls @ 150 mls/hr IV .Q6H40M F[facility]HAD





Discontinued Medications





Morphine Sulfate (Morphine)  4 mg IVP STAT STA


   Stop: 08/23/18 09:05


   Last Admin: 08/23/18 09:24  Dose: 4 mg





MAR Pain Assessment


 Document     08/23/18 09:24  GMD  (Rec: 08/23/18 09:24  GMD  [id number])


     Pain Reassessment


      Is this a pain reassessment?               No


     Presence of Pain


      Presence of Pain                           Yes


     Pain Scale Used


      Pain Scale Used                            Numeric


     Location


      Pain Location Body Site                    Abdomen


     Description


      Intensity of Pain at present               6


IVP Administration


 Document     08/23/18 09:24  GMD  (Rec: 08/23/18 09:24  [facility]  [id number])


     Charges for Administration


      # of IVP Administrations                   1














- Scribe Statement


The provider has reviewed the documentation as recorded by the Gina Guzman





Provider Scribe Attestation:


All medical record entries made by the Gina were at my direction and 

personally dictated by me. I have reviewed the chart and agree that the record 

accurately reflects my personal performance of the history, physical exam, 

medical decision making, and the department course for this patient. I have 

also personally directed, reviewed, and agree with the discharge instructions 

and disposition.








Disposition/Present on Arrival





- Present on Arrival


Any Indicators Present on Arrival: No


History of DVT/PE: No


History of Uncontrolled Diabetes: No


Urinary Catheter: No


History Surgical Site Infection Following: None





- Disposition


Have Diagnosis and Disposition been Completed?: Yes


Diagnosis: 


 Small bowel obstruction





Disposition: HOSPITALIZED


Disposition Time: 13:12


Patient Plan: Admission


Patient Problems: 


 Current Active Problems











Problem Status Onset


 


Small bowel obstruction Acute  











Condition: STABLE

## 2018-08-23 NOTE — CP.PCM.CON
History of Present Illness





- History of Present Illness


History of Present Illness: 


Surgery Consult Note for Dr. Suarez





78M with a PMH of stroke (2013), cholecystectomy, appendectomy, and recurrent 

bowel obstruction presents complaining of abdominal pain. He states the onset 

was early this morning at 3AM following a large BM. He reports being constipated

, having no BMs for 3 days prior to the large BM this morning. The BM was 

described as soft and "normal" appearing, without blood. He describes the pain 

as constant, 7/10, located in the center of the abdomen without radiation, 

improved s/p pain medication in ED. He admits to 4-5 prior episodes of bowel 

obstruction, and states that the pain is similar. He also states that he had an 

NG tube placed in the past for SBO, which relieved his symptoms. He denies fever

, chills, headache, chest pain, SOB, nausea or vomiting. 





PMH: HTN, DM, stroke (2013)


Surg Hx: cholecystectomy, appendectomy; hx EGD and colonoscopy in July 2017 

with polyps removed non-cancerous


Medications: Metformin 500mg daily, Valsartan, Aspirin 81mg daily


Allergy: NKDA


Family Hx: HTN; denies hx colon or breast ca


Social Hx: denies smoking, drinks small glass of wine daily, has puree diet at 

home





PMD: Dr. Mendoza


Primary surgeon: Dr. Suarez





Review of Systems





- Constitutional


Constitutional: absent: Chills, Fever, Headache





- Cardiovascular


Cardiovascular: absent: Chest Pain, Dyspnea, Edema





- Respiratory


Respiratory: absent: Cough, Wheezing





- Gastrointestinal


Gastrointestinal: Abdominal Pain, Constipation.  absent: Change in Stool 

Character, Diarrhea, Hematochezia, Melena, Nausea, Vomiting





- Genitourinary


Genitourinary: absent: Difficulty Urinating, Dysuria, Flank Pain





- Musculoskeletal


Musculoskeletal: absent: Back Pain, Myalgias, Numbness





- Neurological


Additional comments: 


slowed speech s/p stroke








Past Patient History





- Infectious Disease


Hx of Infectious Diseases: None





- Tetanus Immunizations


Tetanus Immunization: Unknown





- Past Social History


Smoking Status: Never Smoked





- CARDIAC


Hx Pacemaker: No





- PULMONARY


Hx Respiratory Disorders: No





- NEUROLOGICAL


Hx Paralysis: No





- HEENT


Hx HEENT Problems: Yes (WEARS RX GLASSES FOR READING)


Hx Cataracts: Yes (BILATERAL SURGERY)





- RENAL


Hx Chronic Kidney Disease: No





- ENDOCRINE/METABOLIC


Hx Diabetes Mellitus Type 2: Yes





- HEMATOLOGICAL/ONCOLOGICAL


Hx Blood Transfusions: No


Hx Blood Transfusion Reaction: No





- INTEGUMENTARY


Hx Dermatological Problems: No





- MUSCULOSKELETAL/RHEUMATOLOGICAL


Hx Musculoskeletal Disorders: Yes (SCIATICA)





- GASTROINTESTINAL


Hx Gastrointestinal Disorders: Yes (APPENDECTOMY(RUPTURED APPENDIX))


Hx Gall Bladder Disease: Yes (CHOLELITHIASIS)


Other/Comment: Bowel obstruction





- GENITOURINARY/GYNECOLOGICAL


Hx Genitourinary Disorders: Yes


Hx Prostate Problems: Yes





- PSYCHIATRIC


Hx Emotional Abuse: No


Hx Physical Abuse: No


Hx Substance Use: No





- SURGICAL HISTORY


Hx Appendectomy: Yes


Hx Cholecystectomy: Yes





- ANESTHESIA


Hx Anesthesia Reactions: No


Hx Malignant Hyperthermia: No





Meds


Allergies/Adverse Reactions: 


 Allergies











Allergy/AdvReac Type Severity Reaction Status Date / Time


 


No Known Allergies Allergy   Verified 08/23/18 13:30














- Medications


Medications: 


 Current Medications





Sodium Chloride (Sodium Chloride 0.9%)  1,000 mls @ 150 mls/hr IV .Q6H40M Pending sale to [facility]


   Last Admin: 08/23/18 13:20 Dose:  150 mls/hr











Physical Exam





- Constitutional


Appears: Well, Non-toxic, No Acute Distress





- Head Exam


Head Exam: ATRAUMATIC, NORMAL INSPECTION, NORMOCEPHALIC





- Eye Exam


Eye Exam: EOMI, PERRL.  absent: Scleral icterus


Pupil Exam: PERRL





- ENT Exam


ENT Exam: Mucous Membranes Moist, Normal Exam, Normal Oropharynx





- Neck Exam


Neck exam: Positive for: Normal Inspection





- Respiratory Exam


Respiratory Exam: Clear to Auscultation Bilateral, NORMAL BREATHING PATTERN.  

absent: Rales, Rhonchi, Wheezes





- Cardiovascular Exam


Cardiovascular Exam: REGULAR RHYTHM, +S1, +S2.  absent: Systolic Murmur





- GI/Abdominal Exam


GI & Abdominal Exam: Distended, Hypoactive Bowel Sounds, Soft.  absent: Guarding

, Rebound, Tenderness


Additional comments: 


Abdomen tympanic





- Extremities Exam


Extremities exam: Positive for: full ROM, normal inspection, pedal pulses 

present.  Negative for: joint swelling, tenderness





- Back Exam


Back exam: NORMAL INSPECTION.  absent: CVA tenderness (L), CVA tenderness (R)





- Neurological Exam


Neurological exam: Alert, CN II-XII Intact, Oriented x3





- Skin


Skin Exam: Dry, Intact, Normal Color, Warm





Results





- Vital Signs


Recent Vital Signs: 


 Last Vital Signs











Temp  98.2 F   08/23/18 08:42


 


Pulse  65   08/23/18 11:31


 


Resp  18   08/23/18 11:31


 


BP  140/76   08/23/18 11:31


 


Pulse Ox  99   08/23/18 11:31














- Labs


Result Diagrams: 


 08/23/18 09:09





 08/23/18 09:09


Labs: 


 Laboratory Results - last 24 hr











  08/23/18 08/23/18 08/23/18





  09:00 09:09 09:09


 


WBC   12.7 H D 


 


RBC   4.80 


 


Hgb   14.8 


 


Hct   43.4 


 


MCV   90.4 


 


MCH   30.8 


 


MCHC   34.1 


 


RDW   13.7 


 


Plt Count   210 


 


MPV   10.4 


 


Gran %   84.1 H 


 


Lymph % (Auto)   10.9 L 


 


Mono % (Auto)   3.8 


 


Eos % (Auto)   1.0 L 


 


Baso % (Auto)   0.2 


 


Gran #   10.70 H 


 


Lymph # (Auto)   1.4 


 


Mono # (Auto)   0.5 


 


Eos # (Auto)   0.1 


 


Baso # (Auto)   0.02 


 


PT   


 


INR   


 


APTT   


 


Sodium    139


 


Potassium    4.7


 


Chloride    103


 


Carbon Dioxide    24


 


Anion Gap    17


 


BUN    29 H


 


Creatinine    1.4


 


Est GFR ( Amer)    59


 


Est GFR (Non-Af Amer)    49


 


Random Glucose    120 H


 


Calcium    9.8


 


Total Bilirubin    0.7


 


AST    37


 


ALT    31


 


Alkaline Phosphatase    42


 


Total Protein    7.8


 


Albumin    4.4


 


Globulin    3.4


 


Albumin/Globulin Ratio    1.3


 


Lipase    203


 


Urine Color   


 


Urine Appearance   


 


Urine pH   


 


Ur Specific Gravity   


 


Urine Protein   


 


Urine Glucose (UA)   


 


Urine Ketones   


 


Urine Blood   


 


Urine Nitrate   


 


Urine Bilirubin   


 


Urine Urobilinogen   


 


Ur Leukocyte Esterase   


 


Urine RBC   


 


Urine WBC   


 


Ur Epithelial Cells   


 


Urine Bacteria   


 


Blood Type  A POSITIVE  


 


Antibody Screen  Negative  


 


BBK History Checked  Patient has bt  














  08/23/18 08/23/18





  09:09 12:00


 


WBC  


 


RBC  


 


Hgb  


 


Hct  


 


MCV  


 


MCH  


 


MCHC  


 


RDW  


 


Plt Count  


 


MPV  


 


Gran %  


 


Lymph % (Auto)  


 


Mono % (Auto)  


 


Eos % (Auto)  


 


Baso % (Auto)  


 


Gran #  


 


Lymph # (Auto)  


 


Mono # (Auto)  


 


Eos # (Auto)  


 


Baso # (Auto)  


 


PT  10.6 


 


INR  0.92 


 


APTT  34.0 


 


Sodium  


 


Potassium  


 


Chloride  


 


Carbon Dioxide  


 


Anion Gap  


 


BUN  


 


Creatinine  


 


Est GFR ( Amer)  


 


Est GFR (Non-Af Amer)  


 


Random Glucose  


 


Calcium  


 


Total Bilirubin  


 


AST  


 


ALT  


 


Alkaline Phosphatase  


 


Total Protein  


 


Albumin  


 


Globulin  


 


Albumin/Globulin Ratio  


 


Lipase  


 


Urine Color   Yellow


 


Urine Appearance   Clear


 


Urine pH   8.0


 


Ur Specific Gravity   <= 1.005


 


Urine Protein   30 H


 


Urine Glucose (UA)   Negative


 


Urine Ketones   Trace H


 


Urine Blood   Trace-lysed H


 


Urine Nitrate   Negative


 


Urine Bilirubin   Negative


 


Urine Urobilinogen   0.2


 


Ur Leukocyte Esterase   Negative


 


Urine RBC   2 - 5


 


Urine WBC   0 - 2


 


Ur Epithelial Cells   None


 


Urine Bacteria   Few


 


Blood Type  


 


Antibody Screen  


 


BBK History Checked  














Assessment & Plan





- Assessment and Plan (Free Text)


Assessment: 


78M with PMH stroke (2013), cholecystectomy, appendectomy, recurrent bowel 

obstruction, presenting with abdominal pain. Partial small bowel obstruction 

noted on CT scan. 








Plan: 


-NPO


-Pain control


-Continue to monitor, pt hemodynamically stable at this time


-WBC 12.7


-No need for NG tube placement at this time


-No acute surgical intervention needed at this time


-Further recs per Dr. Erick Boogie,  PGY-1

## 2018-08-23 NOTE — RAD
Date of service: 



08/23/2018



HISTORY:

upright study, abdominal pain  



COMPARISON:

12/02/2016



FINDINGS:



BOWEL:

Normal. No obstruction. No free air. 



BONES:

Normal.



OTHER FINDINGS:

None.



IMPRESSION:

No active disease.

## 2018-08-23 NOTE — CARD
--------------- APPROVED REPORT --------------





Date of service: 08/23/2018



EKG Measurement

Heart [id number]

[id number]

[id number]

[id number]

[id number]



<Conclusion>

Sinus rhythm with marked sinus arrhythmia

Otherwise normal ECG

## 2018-08-24 VITALS — RESPIRATION RATE: 18 BRPM

## 2018-08-24 LAB
ALBUMIN SERPL-MCNC: 3.3 G/DL (ref 3–4.8)
ALBUMIN/GLOB SERPL: 1.2 {RATIO} (ref 1.1–1.8)
ALT SERPL-CCNC: 31 U/L (ref 7–56)
AST SERPL-CCNC: 40 U/L (ref 17–59)
BASOPHILS # BLD AUTO: 0.02 K/MM3 (ref 0–2)
BASOPHILS NFR BLD: 0.3 % (ref 0–3)
BUN SERPL-MCNC: 19 MG/DL (ref 7–21)
CALCIUM SERPL-MCNC: 8.8 MG/DL (ref 8.4–10.5)
EOSINOPHIL # BLD: 0.6 10*3/UL (ref 0–0.7)
EOSINOPHIL NFR BLD: 9.1 % (ref 1.5–5)
ERYTHROCYTE [DISTWIDTH] IN BLOOD BY AUTOMATED COUNT: 13.9 % (ref 11.5–14.5)
GFR NON-AFRICAN AMERICAN: > 60
GRANULOCYTES # BLD: 4.5 10*3/UL (ref 1.4–6.5)
GRANULOCYTES NFR BLD: 64.8 % (ref 50–68)
HGB BLD-MCNC: 13.3 G/DL (ref 14–18)
LYMPHOCYTES # BLD: 1.4 10*3/UL (ref 1.2–3.4)
LYMPHOCYTES NFR BLD AUTO: 20.3 % (ref 22–35)
MCH RBC QN AUTO: 30 PG (ref 25–35)
MCHC RBC AUTO-ENTMCNC: 33 G/DL (ref 31–37)
MCV RBC AUTO: 90.8 FL (ref 80–105)
MONOCYTES # BLD AUTO: 0.4 10*3/UL (ref 0.1–0.6)
MONOCYTES NFR BLD: 5.5 % (ref 1–6)
PLATELET # BLD: 195 10^3/UL (ref 120–450)
PMV BLD AUTO: 11.2 FL (ref 7–11)
RBC # BLD AUTO: 4.44 10^6/UL (ref 3.5–6.1)
WBC # BLD AUTO: 6.9 10^3/UL (ref 4.5–11)

## 2018-08-24 RX ADMIN — INSULIN LISPRO SCH: 100 INJECTION, SOLUTION INTRAVENOUS; SUBCUTANEOUS at 16:00

## 2018-08-24 RX ADMIN — INSULIN LISPRO SCH: 100 INJECTION, SOLUTION INTRAVENOUS; SUBCUTANEOUS at 23:44

## 2018-08-24 RX ADMIN — INSULIN LISPRO SCH: 100 INJECTION, SOLUTION INTRAVENOUS; SUBCUTANEOUS at 13:22

## 2018-08-24 RX ADMIN — INSULIN LISPRO SCH: 100 INJECTION, SOLUTION INTRAVENOUS; SUBCUTANEOUS at 07:54

## 2018-08-24 NOTE — CP.PCM.PN
Subjective





- Date & Time of Evaluation


Date of Evaluation: 08/24/18


Time of Evaluation: 07:20





- Subjective


Subjective: 


Surgery Progress Note for Dr. Suarez





Pt seen and examined at bedside. Denies abdominal pain currently, states pain 

medication is helping. No acute events reported overnight. Denies BM or passing 

flatus since admission. 





Objective





- Vital Signs/Intake and Output


Vital Signs (last 24 hours): 


 











Temp Pulse Resp BP Pulse Ox


 


 98.3 F   59 L  20   150/76   96 


 


 08/24/18 06:00  08/24/18 06:00  08/24/18 06:00  08/24/18 06:00  08/24/18 06:00








Intake and Output: 


 











 08/24/18 08/24/18





 06:59 18:59


 


Intake Total 1500 


 


Output Total 1300 


 


Balance 200 














- Medications


Medications: 


 Current Medications





Benzocaine/Menthol (Cepacol Sore Throat)  1 juana MT Q4H PRN


   PRN Reason: Sore Throat


   Last Admin: 08/23/18 18:00 Dose:  1 juana


Dextrose (Dextrose 50% Inj)  0 ml IV STAT PRN; Protocol


   PRN Reason: Hypoglycemia Protocol


Lactated Ringer's (Lactated Ringer's)  1,000 mls @ 100 mls/hr IV .Q10H [facility]


   Last Admin: 08/24/18 00:01 Dose:  100 mls/hr


Dextrose (Dextrose 5% In Water 1000 Ml)  1,000 mls @ 0 mls/hr IV .Q0M PRN; 

Protocol; Per Protocol


   PRN Reason: Hypoglycemia Protocol


Insulin Human Lispro (Humalog Low)  0 units SC ACHS F[facility]AD


   PRN Reason: Protocol


   Last Admin: 08/24/18 07:54 Dose:  Not Given


Losartan Potassium (Cozaar)  12.5 mg PO DAILY [facility]


Morphine Sulfate (Morphine)  2 mg IVP Q4H PRN


   PRN Reason: Pain, moderate (4-7)


Ondansetron HCl (Zofran Inj)  4 mg IVP Q4H PRN


   PRN Reason: Nausea/Vomiting











- Labs


Labs: 


 





 08/24/18 05:45 





 08/24/18 05:45 





 











PT  10.6 SECONDS (9.4-12.5)   08/23/18  09:09    


 


INR  0.92   08/23/18  09:09    


 


APTT  34.0 Seconds (25.1-36.5)   08/23/18  09:09    














- Head Exam


Head Exam: ATRAUMATIC, NORMAL INSPECTION





- Eye Exam


Eye Exam: EOMI, Normal appearance, PERRL





- ENT Exam


ENT Exam: Mucous Membranes Moist





- Respiratory Exam


Respiratory Exam: Clear to Ausculation Bilateral, NORMAL BREATHING PATTERN





- Cardiovascular Exam


Cardiovascular Exam: REGULAR RHYTHM, +S1, +S2





- GI/Abdominal Exam


GI & Abdominal Exam: Soft, Normal Bowel Sounds





- Extremities Exam


Extremities Exam: Full ROM, Normal Capillary Refill, Normal Inspection





- Neurological Exam


Neurological Exam: Alert, Awake, CN II-XII Intact, Oriented x3





- Skin


Skin Exam: Dry, Intact, Normal Color, Warm





Assessment and Plan





- Assessment and Plan (Free Text)


Assessment: 


78M with PMH stroke (2013), cholecystectomy, appendectomy, recurrent bowel 

obstruction, presenting with abdominal pain. Partial small bowel obstruction 

noted on CT scan. 


Plan: 


-NPO


-Pain control


-Continue to monitor, pt hemodynamically stable at this time


-No leukocytosis this am


-No need for NG tube placement at this time


-No acute surgical intervention needed at this time


-Encourage ambulation


-F/u small bowel series


-Further recs per Dr. Erick Boogie, DO PGY-1

## 2018-08-24 NOTE — RAD
Date of service: 



08/24/2018



PROCEDURE:  Small bowel series



HISTORY:

Eval for interval change



COMPARISON:





TECHNIQUE:

Single contrast study



FINDINGS:

The  film was unremarkable. The small bowel is normal in 

caliber. There is no mucosal fold thickening or inflammatory change. 

Contrast reaches the colon at 3 hours



IMPRESSION:

Negative study

## 2018-08-24 NOTE — HP
Copied To:  Denny Mendoza DO

Attending MD:  Denny Mendoza DO



HISTORY OF PRESENT ILLNESS:  I know him very well from the office for many

years and also for multiple small-bowel obstructions.  He is an 78-year-old

man who after having a bowel movement began having severe abdominal pain. 

He knows that pain he has had it many times before.  He has not been in the

emergency room and again he is having a partial small bowel obstruction

secondary to adhesions.



PAST MEDICAL HISTORY:  He has a past medical history of multiple

small-bowel obstructions, diabetes type 2, appendectomy, cholecystectomy. 

He has had sciatica in the past.  He wears glasses.  He had bilateral

cataract surgery.  He is a diabetic.  He has had sciatica.  He has had

appendectomy, ruptured appendix, cholecystitis, bowel obstructions,

prostate problems.



FAMILY HISTORY:  No known family history.



SOCIAL HISTORY:  No smoking.  Does have red wine after dinner.  No

substance abuse.



ALLERGIES:  NO KNOWN DRUG ALLERGIES.



MEDICATIONS:  He is on Diovan of hypertension, Glucophage for diabetes,

Ecotrin, omega-3 fatty acids, CoQ10, MiraLax for constipation, Cozaar for

hypertension.



REVIEW OF SYSTEMS:  No fevers.  He has severe abdominal pain, status post

bowel movement.  No diarrhea, nausea, vomiting.



PHYSICAL EXAMINATION:

GENERAL:  He is alert, well-developed, well-nourished.  He is in distress. 

He needs medication for pain, which helps him.

He is well appearing, but he is uncomfortable with severe abdominal pain. 

Alert and oriented x3.

HEENT:  Extraocular muscles are intact.  Pupils equal, reactive to light.

NECK:  Supple.  No JVD.  No adenopathy palpated.  Thyroid midline.

HEART:  Regular rate.  Normal S1, S2.

LUNGS:  Decreased breath sounds.  No wheezes, rhonchi or rales.

ABDOMEN:  Decreased bowel sounds.  There is mild guarding.  No rebound.  No

rigidity.  Diffuse tenderness everywhere.  Very much decreased bowel

sounds.

SKIN:  Good color.  Good turgor.  No apparent rashes or ulcers.

NEUROLOGIC:  He is alert and oriented x3.  Cranial nerves II through XII

grossly intact.  He has motor intact.

VITAL SIGNS:  He has a 98.5 temp, 65 pulse, 16 respiratory rate, 152/76

blood pressure, 99% O2 sat on room air.



LABORATORY DATA:  He had multiple tests done.  He has a 141 sodium now, 4.2

potassium.  BUN is 19, creatinine 1.4, it was 29 and 1.4 when he came in,

now it is 19 and 1.1.  IV fluids are helping.  GFR is up to 60, sugar is

87, calcium is 8.8, total bili is 1, AST is 40, ALT is 31, alk phos 39,

total protein 6.2, lipase is 203.  INR is 0.92.  Urine was few.  His white

count when he came in was 12.7, it is down to 6.9; hemoglobin 13.3,

hematocrit 40.3, platelets are 195.  The x-ray of the abdomen was fine, but

the abdominopelvic CAT scan showed partial small bowel obstruction

secondary to adhesions.



IMPRESSION:  Surgery had seen the patient once.  They said no surgery at

this time.  He is n.p.o., IV fluids, pain medications and hopefully he will

walk a little bit to get the bowels moving and he is in bed, saying that he

needs the pain medications to get him through.  Lalo Tompkins who has

got partial small bowel obstruction again with abdominal pain.





__________________________________________

Denny Mendoaz DO





DD:  08/24/2018 8:39:26

DT:  08/24/2018 8:42:20

Job # [id number]

## 2018-08-25 VITALS — HEART RATE: 60 BPM | TEMPERATURE: 98 F | OXYGEN SATURATION: 97 %

## 2018-08-25 VITALS — DIASTOLIC BLOOD PRESSURE: 70 MMHG | SYSTOLIC BLOOD PRESSURE: 158 MMHG

## 2018-08-25 LAB
ALBUMIN SERPL-MCNC: 3.9 G/DL (ref 3–4.8)
ALBUMIN/GLOB SERPL: 1.2 {RATIO} (ref 1.1–1.8)
ALT SERPL-CCNC: 26 U/L (ref 7–56)
AST SERPL-CCNC: 41 U/L (ref 17–59)
BUN SERPL-MCNC: 13 MG/DL (ref 7–21)
CALCIUM SERPL-MCNC: 9.3 MG/DL (ref 8.4–10.5)
ERYTHROCYTE [DISTWIDTH] IN BLOOD BY AUTOMATED COUNT: 13.6 % (ref 11.5–14.5)
GFR NON-AFRICAN AMERICAN: > 60
HGB BLD-MCNC: 15.2 G/DL (ref 14–18)
MCH RBC QN AUTO: 30.5 PG (ref 25–35)
MCHC RBC AUTO-ENTMCNC: 33.9 G/DL (ref 31–37)
MCV RBC AUTO: 90 FL (ref 80–105)
PLATELET # BLD: 194 10^3/UL (ref 120–450)
PMV BLD AUTO: 11 FL (ref 7–11)
RBC # BLD AUTO: 4.99 10^6/UL (ref 3.5–6.1)
WBC # BLD AUTO: 6.8 10^3/UL (ref 4.5–11)

## 2018-08-25 RX ADMIN — INSULIN LISPRO SCH: 100 INJECTION, SOLUTION INTRAVENOUS; SUBCUTANEOUS at 11:46

## 2018-08-25 RX ADMIN — INSULIN LISPRO SCH: 100 INJECTION, SOLUTION INTRAVENOUS; SUBCUTANEOUS at 08:12

## 2018-08-25 NOTE — DS
Copied To:  Denny Mendoza DO

Attending MD:  Denny Mendoza DO



HISTORY OF PRESENT ILLNESS:  He had 2 bowel movements in the last 12-14

hours.  He was here for small bowel obstruction.  He is actually much

better.  No abdominal pain.  He is eating up a storm.  He is passing gas

and I think this small bowel obstruction has resolved.  He is in good

spirits.



PHYSICAL EXAMINATION:

VITAL SIGNS:  He has a 98 temp, 60 pulse, 158/70 blood pressure, 18

respiratory rate, 97% sat on room air.

HEENT:  His head is atraumatic, normocephalic.

HEART:  Regular rate.

LUNGS:  Clear to auscultation.

ABDOMEN:  Soft, nontender.  Positive bowel sounds.  No guarding, no

rebound, no CVA tenderness.  Back to normal.

EXTREMITIES:  Okay.



MEDICATIONS:  He is currently on Cepacol, Cozaar, dextrose, insulin,

lactated Ringer's, morphine and Zofran.



LABORATORY DATA:  He has a white count of 6.8, hemoglobin 15.2, hematocrit

44.9, platelets of 194.  He has a 144 sodium, potassium 4.2, BUN 13,

creatinine 1, GFR is greater than 60, sugar is 83, calcium is 9.3, total

bili is 1, AST is 41, ALT is 26, alk phos 47, total protein 7.1, albumin is

3.9.



ASSESSMENT AND PLAN:  He will be discharged today if okay with surgery.  I

have discussed with him a few things plus his wife was in the room.  He has

to eat no bananas, rice, applesauce or toast, the only things that

constipate him.  He is to keep a very good blood sugar  very good low

sugar diet.  I want to see him in the office next week.  He is going to go

home on his regular medications that he takes before he got here,

metformin, he switched to Cozaar from Diovan, CoQ10, MiraLax twice a day,

omega and Ecotrin.  He did well.  He had a small bowel obstruction,

diabetes, abdominal pain.





__________________________________________

Denny Mendoza DO





DD:  08/25/2018 12:51:45

DT:  08/25/2018 12:53:07

Job # [id number]



NINA

## 2018-08-25 NOTE — CP.PCM.PN
Subjective





- Date & Time of Evaluation


Date of Evaluation: 08/25/18


Time of Evaluation: 08:04





- Subjective


Subjective: 





Surgery: Dr. Suarez





Pt seen and examined. No acute overnight events. States he's feeling well this 

morning. Pt admits to flatus and BMs yesterday & is tolerating CLD. He denies 

any more abdominal pain, nausea/vomiting, fevers or chills. 





Objective





- Vital Signs/Intake and Output


Vital Signs (last 24 hours): 


 











Temp Pulse Resp BP Pulse Ox


 


 98 F   60   18   158/71 H  97 


 


 08/25/18 06:00  08/25/18 06:00  08/25/18 06:00  08/25/18 06:00  08/25/18 06:00








Intake and Output: 


 











 08/25/18 08/25/18





 06:59 18:59


 


Intake Total 120 


 


Balance 120 














- Medications


Medications: 


 Current Medications





Benzocaine/Menthol (Cepacol Sore Throat)  1 juana MT Q4H PRN


   PRN Reason: Sore Throat


   Last Admin: 08/23/18 18:00 Dose:  1 juana


Dextrose (Dextrose 50% Inj)  0 ml IV STAT PRN; Protocol


   PRN Reason: Hypoglycemia Protocol


Lactated Ringer's (Lactated Ringer's)  1,000 mls @ 100 mls/hr IV .Q10H FARHAD


   Last Admin: 08/24/18 23:45 Dose:  100 mls/hr


Dextrose (Dextrose 5% In Water 1000 Ml)  1,000 mls @ 0 mls/hr IV .Q0M PRN; 

Protocol; Per Protocol


   PRN Reason: Hypoglycemia Protocol


Insulin Human Lispro (Humalog Low)  0 units SC ACHS FARHAD


   PRN Reason: Protocol


   Last Admin: 08/24/18 23:44 Dose:  Not Given


Losartan Potassium (Cozaar)  12.5 mg PO DAILY FARHAD


   Last Admin: 08/24/18 09:00 Dose:  12.5 mg


Losartan Potassium (Cozaar)  25 mg PO DAILY [facility]


Morphine Sulfate (Morphine)  2 mg IVP Q4H PRN


   PRN Reason: Pain, moderate (4-7)


Ondansetron HCl (Zofran Inj)  4 mg IVP Q4H PRN


   PRN Reason: Nausea/Vomiting











- Labs


Labs: 


 





 08/25/18 06:00 





 08/25/18 06:00 





 











PT  10.6 SECONDS (9.4-12.5)   08/23/18  09:09    


 


INR  0.92   08/23/18  09:09    


 


APTT  34.0 Seconds (25.1-36.5)   08/23/18  09:09    














- Constitutional


Appears: Well, No Acute Distress





- Head Exam


Head Exam: ATRAUMATIC, NORMOCEPHALIC





- Eye Exam


Eye Exam: Normal appearance





- ENT Exam


ENT Exam: Mucous Membranes Moist





- Respiratory Exam


Respiratory Exam: NORMAL BREATHING PATTERN





- GI/Abdominal Exam


GI & Abdominal Exam: Soft.  absent: Distended, Guarding, Tenderness, Rebound





- Neurological Exam


Neurological Exam: Alert, Awake, Oriented x3





- Skin


Skin Exam: Dry, Warm





Assessment and Plan





- Assessment and Plan (Free Text)


Assessment: 





78M with partial SBO; resolved 


Plan: 





- advance to soft diet; if tolerating ok to DC from surgical standpoint 


- d/w Dr. Erick Rocha